# Patient Record
Sex: FEMALE | Race: BLACK OR AFRICAN AMERICAN | NOT HISPANIC OR LATINO | Employment: FULL TIME | URBAN - METROPOLITAN AREA
[De-identification: names, ages, dates, MRNs, and addresses within clinical notes are randomized per-mention and may not be internally consistent; named-entity substitution may affect disease eponyms.]

---

## 2017-02-17 ENCOUNTER — TELEPHONE (OUTPATIENT)
Dept: CARDIOLOGY UNIT | Facility: HOSPITAL | Age: 22
End: 2017-02-17

## 2017-02-17 ENCOUNTER — OFFICE VISIT (OUTPATIENT)
Dept: URGENT CARE | Age: 22
End: 2017-02-17
Payer: COMMERCIAL

## 2017-02-17 PROCEDURE — G0382 LEV 3 HOSP TYPE B ED VISIT: HCPCS

## 2017-02-28 ENCOUNTER — ALLSCRIPTS OFFICE VISIT (OUTPATIENT)
Dept: OTHER | Facility: OTHER | Age: 22
End: 2017-02-28

## 2017-05-05 ENCOUNTER — OFFICE VISIT (OUTPATIENT)
Dept: URGENT CARE | Age: 22
End: 2017-05-05
Payer: COMMERCIAL

## 2017-05-05 PROCEDURE — G0382 LEV 3 HOSP TYPE B ED VISIT: HCPCS | Performed by: FAMILY MEDICINE

## 2017-05-20 ENCOUNTER — OFFICE VISIT (OUTPATIENT)
Dept: URGENT CARE | Age: 22
End: 2017-05-20
Payer: COMMERCIAL

## 2017-05-20 PROCEDURE — G0382 LEV 3 HOSP TYPE B ED VISIT: HCPCS | Performed by: FAMILY MEDICINE

## 2017-08-18 ENCOUNTER — ALLSCRIPTS OFFICE VISIT (OUTPATIENT)
Dept: OTHER | Facility: OTHER | Age: 22
End: 2017-08-18

## 2017-09-01 ENCOUNTER — TRANSCRIBE ORDERS (OUTPATIENT)
Dept: ADMINISTRATIVE | Age: 22
End: 2017-09-01

## 2017-09-01 ENCOUNTER — APPOINTMENT (OUTPATIENT)
Dept: LAB | Age: 22
End: 2017-09-01
Attending: PREVENTIVE MEDICINE

## 2017-09-01 DIAGNOSIS — Z01.84 IMMUNITY STATUS TESTING: ICD-10-CM

## 2017-09-01 DIAGNOSIS — Z01.84 IMMUNITY STATUS TESTING: Primary | ICD-10-CM

## 2017-09-01 PROCEDURE — 36415 COLL VENOUS BLD VENIPUNCTURE: CPT

## 2017-09-01 PROCEDURE — 86706 HEP B SURFACE ANTIBODY: CPT

## 2017-09-03 LAB — HBV SURFACE AB SER-ACNC: 21.79 MIU/ML

## 2018-01-10 NOTE — MISCELLANEOUS
Message   Recorded as Task   Date: 05/20/2016 11:31 AM, Created By: Rasheeda Rice   Task Name: Medical Complaint Callback   Assigned To: [facility] triage,Team   Regarding Patient: Orin Alex, Status: In Progress   Comment:   Kat Dorman - 20 May 2016 11:31 AM    TASK CREATED  Caller: Rebecca Gomez, Mother; Medical Complaint; [phone] [facility] Phone)  Caden Reyna - 20 May 2016 11:36 AM    TASK IN PROGRESS   Bridget Guerrier - 20 May 2016 11:38 AM    TASK EDITED  LM for pt to call back  Kaitlin Moody - 20 May 2016 2:33 PM    TASK EDITED  LM call back        Active Problems   1  Allergic rhinitis (477 9) (J30 9)  2  Anisometropia (367 31) (H52 31)  3  Nasal polyp (471 9) (J33 9)  4  Need for HPV vaccination (V04 89) (Z23)    Current Meds  1  Fluticasone Propionate 50 MCG/ACT Nasal Suspension; USE 2 SPRAYS IN EACH   NOSTRIL ONCE DAILY; Therapy: 25XCJ5185 to (Evaluate:79Cwf9523)  Requested for: 36Xfz9176; Last   Rx:06Rzr2409 Ordered  2  Loratadine 10 MG Oral Tablet; take 1 tablet by mouth once daily; Therapy: 06VPQ8006 to (Olivia Gregory)  Requested for: 24KNT4030; Last   Rx:07Elr2347 Ordered  3  Tobramycin 0 3 % Ophthalmic Solution; INSTILL 1 DROP INTO AFFECTED EYE(S) 4   TIMES DAILY; Therapy: 28AYC9718 to (Last Rx:00Xmr0447)  Requested for: 55Jug3789 Ordered    Allergies   1  No Known Drug Allergies   2  Animal dander - Cats  3  Animal dander - Dogs  4  Grass  5   Trees    Signatures   Electronically signed by : Tanisha Pool, ; May 20 2016  3:59PM EST                       (Author)    Electronically signed by : OSMAR Ballard ; May 20 2016  4:24PM EST                       (Author)

## 2018-01-11 NOTE — PROGRESS NOTES
Assessment    1  Tinea versicolor (111 0) (B36 0)   2  Shortness of breath (786 05) (R06 02)   3  Allergic rhinitis (477 9) (J30 9)   4  Encounter to establish care (V65 8) (Z76 89)    Plan  Shortness of breath    · Ventolin  (90 Base) MCG/ACT Inhalation Aerosol Solution; INHALE 2  PUFFS EVERY 4-6 HOURS AS NEEDED   · Spirometry w/Bronchodilation; Status:Active; Requested for:79Znu7945;   Tinea versicolor    · Ketoconazole 200 MG Oral Tablet; Take 2 tablets once    Discussion/Summary  health maintenance visit Currently, she eats a healthy diet, has an adequate exercise regimen and plays soccer  cervical cancer screening is not indicated Breast cancer screening: breast cancer screening is not indicated  Colorectal cancer screening: colorectal cancer screening is not indicated  Osteoporosis screening: bone mineral density testing is not indicated  Advice and education were given regarding contraception, sunscreen use, self skin examination and seat belt use  Patient discussion: discussed with the patient  25 yr old female[de-identified]    1) Skin rash- likely tinea versicolor, ketoconazole sent to pharmacy 400 mg one time, may also buy Selsun blue shampoo to wash affected areas  Continue to monitor  2) Shortness of breath - responded to albuterol, Ventolin sent to pharmacy, given script for spirometry to eval for asthma  Will follow up results  No wheezing on exam today  3) Allergic rhinitis - stable, may be contributing to #2, continue with loratadine 10 mg at bedtime  Consider daily Flonase if symptoms worsen  RTC in 2 months for follow up  Discuss HPV vaccination at follow up visit, pt did not get series previously, mother declined  The patient was counseled regarding  Self Referrals: No      Chief Complaint  establish care, skin rash      History of Present Illness  , Adult Female: The patient is being seen for a health maintenance evaluation   The last health maintenance visit was 1 year(s) ago    General Health: The patient's health since the last visit is described as good  She has regular dental visits  She complains of vision problems  She denies hearing loss  Immunizations status: up to date   wears glasses, last appt was recently  Lifestyle:  She consumes a diverse and healthy diet  She does not have any weight concerns  She exercises regularly  She does not use tobacco  She consumes alcohol  She denies drug use  Reproductive health:  she reports normal menses  Screening: cancer screening reviewed and current  metabolic screening reviewed and current  risk screening reviewed and current  HPI: sickle cell trait, cramps easier with exercises, twin, She was twin A, family hx significant for sickle cell trait, mom has sickle cell trait  She is studying nursing in college goes to [employer] will be transferring to [employer]  Around thanksgiving having chest pain, developed difficulty breathing, with muscle soreness, states her aunt gave her neb treatment and symptoms resolved  Happens approx 1 per year  No heart palpitations  Saw ENT for nasal polyp, eval hearing is off retracted ear drum  Never diagnosed with asthma  She is also complaining of an itchy body rash on her trunk and upper extremities, states the red rash went away but is left with discolorations  Of note pt is sexually active, in a monogamous relationship with 1 partner, not on contraception, uses condoms  Has only had 1 sexual partner  No pregnancies, has regular menses  Review of Systems    Constitutional: no fever, not feeling poorly, no chills and not feeling tired  ENT: hearing loss, but no earache, no nosebleeds, no sore throat, no nasal discharge and no hoarseness  Cardiovascular: chest pain and palpitations  Respiratory: no shortness of breath, no cough and no wheezing  Gastrointestinal: no abdominal pain, no nausea, no vomiting, no constipation, no diarrhea and no blood in stools     Genitourinary: no dysuria, no pelvic pain, no vaginal discharge, no incontinence, no dysmenorrhea and no unexplained vaginal bleeding  Musculoskeletal: no arthralgias, no joint swelling, no limb pain, no myalgias, no joint stiffness and no limb swelling  Integumentary: a rash, itching and starting in november itchy rash all over her body, depigmintation of upper ext, but as noted in HPI, no breast pain, no skin lesions, no skin wound and no breast lump  Neurological: no headache, no numbness, no tingling, no confusion, no dizziness, no limb weakness, no convulsions, no fainting and no difficulty walking  Psychiatric: not suicidal, no anxiety, no personality change, no sleep disturbances, no depression and no emotional problems  Endocrine: no muscle weakness and no deepening of the voice  no feelings of weakness   Hematologic/Lymphatic: no tendency for easy bleeding and no tendency for easy bruising  Active Problems    1  Allergic rhinitis (477 9) (J30 9)   2  Anisometropia (367 31) (H52 31)   3  Nasal polyp (471 9) (J33 9)   4   Need for HPV vaccination (V04 89) (Z23)    Past Medical History    · History of Birth History   · History of Cellulitis of foot (682 7) (L03 119)   · Conjunctivitis (372 30) (H10 9)   · History of Foot infection (686 9) (L08 9)   · History of Insect bite, multiple (919 4,E906 4) (W57 XXXA)   · History of Paronychia of toe (681 11) (L03 039)    Surgical History    · History of Adenoidectomy   · History of Sinus Surgery   · History of Tonsillectomy    Family History  Mother    · Family history of Sickle Cell Trait  Father    · Family history of Hypertension (V17 49)   · Family history of Overweight  Sister    · Family history of Exercise-induced asthma   · Family history of Hypertension (V17 49)   · Family history of Sickle Cell Trait  Brother    · Family history of Sickle Cell Trait  Grandmother    · Family history of allergic rhinitis (V19 6) (Z84 89)  Grandfather    · Family history of Cancer  Family History    · Family history of Cancer   · Family history of Hypertension (V17 49)   · Family history of Overweight   · Family history of Sickle Cell Trait    Social History    · Lives with mother (single parent)   · Marital History - Single   · Never A Smoker   · Preferred Language English   · Sports    Current Meds   1  Loratadine 10 MG Oral Tablet; take 1 tablet by mouth once daily; Therapy: 90JVT0863 to (Kaylin Glasgow)  Requested for: 28PII6011; Last   Rx:38Rze1086 Ordered    Allergies    1  No Known Drug Allergies    2  Animal dander - Cats   3  Animal dander - Dogs   4  Grass   5  Trees    Vitals   Recorded: 24OOK1084 02:38PM   Temperature 98 8 F   Heart Rate 76   Respiration 16   Systolic 187   Diastolic 70   Height 5 ft 3 6 in   Weight 157 lb 2 oz   BMI Calculated 27 31   BSA Calculated 1 76   BMI Percentile 88 %   2-20 Stature Percentile 40 %   2-20 Weight Percentile 86 %   Pain Scale 0     Physical Exam    Constitutional   General appearance: No acute distress, well appearing and well nourished  Head and Face   Head and face: Normal     Palpation of the face and sinuses: No sinus tenderness  Eyes   Conjunctiva and lids: No swelling, erythema or discharge  Ears, Nose, Mouth, and Throat   External inspection of ears and nose: Normal     Otoscopic examination: Tympanic membranes translucent with normal light reflex  Canals patent without erythema  Hearing: Normal     Nasal mucosa, septum, and turbinates: Normal without edema or erythema  Oropharynx: Normal with no erythema, edema, exudate or lesions  Neck   Neck: Supple, symmetric, trachea midline, no masses  Thyroid: Normal, no thyromegaly  Pulmonary   Respiratory effort: No increased work of breathing or signs of respiratory distress  Auscultation of lungs: Clear to auscultation  Cardiovascular   Auscultation of heart: Normal rate and rhythm, normal S1 and S2, no murmurs  Abdomen   Abdomen: Non-tender, no masses  Lymphatic   Palpation of lymph nodes in neck: No lymphadenopathy  Musculoskeletal   Gait and station: Normal     Digits and nails: Normal without clubbing or cyanosis  Range of motion: Normal     Stability: Normal     Muscle strength/tone: Normal     Skin discoloration on trunk and arms, consistent with tinea versicolor  Neurologic   Cranial nerves: Cranial nerves II-XII intact  Reflexes: 2+ and symmetric  Sensation: No sensory loss  Psychiatric   Orientation to person, place, and time: Normal     Mood and affect: Normal        Attending Note  Attending Note: Attending Note: the staff discussed the patient on the day of the visit, I discussed the case with the Resident and reviewed the Resident's note, I supervised the Resident and I agree with the Resident management plan as it was presented to me  Level of Participation: I was present in clinic, but did not examine the patient  I agree with the Resident's note        Future Appointments    Date/Time Provider Specialty Site   03/09/2017 02:00 PM Kristen Biswas MD Family Medicine [address]   Electronically signed by : Matt Mendez MD; Dec  7 2016  4:52PM EST                       (Author)    Electronically signed by : OSMAR Matamoros ; Dec  8 2016  1:15PM EST                       (Author)

## 2018-01-12 NOTE — PROGRESS NOTES
Assessment    1  Encounter for preventive health examination (V70 0) (Z00 00)   2  Moderate persistent asthma without complication (530 40) (P72 35)   3  Insomnia (780 52) (G47 00)    Plan  Asthma    · Qvar 40 MCG/ACT Inhalation Aerosol Solution; INHALE 2 PUFFS TWICE DAILY  PPD screening test    · PPD    Discussion/Summary  health maintenance visit     24 yo F with the followin  Asthma - Has night time awakenings about 2x per week; moderate persistent asthma  Will start ICS (Qvar)  2  Insomnia - Advised on good sleep hygiene  Advised that not eat much during the day, and then eating a 2AM when she returns home may increase energy and wakefulness  Advised to try eating meals during the day while at work  Tx for #1 should help decrease nocturnal coughing and decrease Albuterol use which has a stimulatory effect  3  HM - Immunizations are up to date  Plans to find gynecologist in order to do cervical CA screening  PPD given today  Return on Monday for read  RTC in 1 year or sooner as needed  4  RTC in 1 year or sooner as needed  The patient was counseled regarding instructions for management, impressions  Possible side effects of new medications were reviewed with the patient/guardian today  The treatment plan was reviewed with the patient/guardian  The patient/guardian understands and agrees with the treatment plan      Chief Complaint  school PEx  Insomnia      History of Present Illness  HM, Adult Female: The patient is being seen for a health maintenance evaluation  General Health:   Screening:   HPI: 24 yo F who presents for wellness visit and school PEx  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  C/o insomnia  Works from [employer] to [employer] and has difficulty sleeping  Works as a waterer and caterer  Snacks during the day, but never eats an actual meal  Comes home and eats around 2-2:30am before going to bed  Night time Sxs about 2x per week requiring albuterol   Parents have 3 cats and 2 dogs of which she's allergic  Review of Systems    Constitutional: no fever and no chills  Gastrointestinal: no abdominal pain, no nausea, no vomiting, no constipation and no diarrhea  Genitourinary: no vaginal discharge  Musculoskeletal: LE swelling, but no arthralgias  Neurological: no headache  Active Problems    1  Acute sinus infection (461 9) (J01 90)   2  Allergic rhinitis (477 9) (J30 9)   3  Anisometropia (367 31) (H52 31)   4  Contact dermatitis (692 9) (L25 9)   5  Encounter to establish care (V65 8) (Z76 89)   6  Moderate persistent asthma without complication (826 65) (A51 19)   7  Nasal polyp (471 9) (J33 9)   8  Need for HPV vaccination (V04 89) (Z23)   9  Shortness of breath (786 05) (R06 02)   10   Tinea versicolor (111 0) (B36 0)    Past Medical History    · History of Birth History   · History of Cellulitis of foot (682 7) (L03 119)   · Conjunctivitis (372 30) (H10 9)   · History of Foot infection (686 9) (L08 9)   · History of Insect bite, multiple (919 4,E906 4) (W57 XXXA)   · History of Paronychia of toe (681 11) (L03 039)    Surgical History    · History of Adenoidectomy   · History of Sinus Surgery   · History of Tonsillectomy    Family History  Mother    · Family history of Sickle Cell Trait  Father    · Family history of Hypertension (V17 49)   · Family history of Overweight  Sister    · Family history of Exercise-induced asthma   · Family history of Hypertension (V17 49)   · Family history of Sickle Cell Trait  Brother    · Family history of Sickle Cell Trait  Grandmother    · Family history of allergic rhinitis (V19 6) (Z84 89)  Grandfather    · Family history of Cancer  Family History    · Family history of Cancer   · Family history of Hypertension (V17 49)   · Family history of Overweight   · Family history of Sickle Cell Trait    Social History    · Lives with mother (single parent)   · Marital History - Single   · Never A Smoker   · Preferred Language English   · Sports    Current Meds   1  Loratadine 10 MG Oral Tablet; take 1 tablet by mouth once daily; Therapy: 16EHF1830 to (Evaluate:55Hqf9387)  Requested for: 44BUL1046; Last   Rx:28Feb2017 Ordered   2  Mometasone Furoate 0 1 % External Cream; APPLY SPARINGLY TO AFFECTED AREAS   TWICE DAILY  (AM AND PM); Therapy: 72RTC7169 to (Last VE:72DRW5768)  Requested for: 44GYP1253 Ordered   3  Ventolin  (90 Base) MCG/ACT Inhalation Aerosol Solution; INHALE 2 PUFFS   EVERY 4-6 HOURS AS NEEDED; Therapy: 60Wqd7711 to (Evaluate:30Mar2017)  Requested for: 28Feb2017; Last   Rx:28Feb2017 Ordered    Allergies    1  No Known Drug Allergies    2  Animal dander - Cats   3  Animal dander - Dogs   4  Grass   5  Trees    Vitals   Recorded: 72XQW9612 08:35AM   Temperature 97 2 F   Heart Rate 88   Respiration 16   Systolic 164   Diastolic 72   Height 5 ft 2 in   Weight 163 lb 4 oz   BMI Calculated 29 86   BSA Calculated 1 75   Pain Scale 0     Physical Exam    Constitutional   General appearance: No acute distress, well appearing and well nourished  Head and Face   Head and face: Normal     Palpation of the face and sinuses: No sinus tenderness  Eyes   Conjunctiva and lids: No swelling, erythema or discharge  Pupils and irises: Equal, round, reactive to light  Ears, Nose, Mouth, and Throat   External inspection of ears and nose: Normal     Otoscopic examination: Tympanic membranes translucent with normal light reflex  Canals patent without erythema  Hearing: Normal     Nasal mucosa, septum, and turbinates: Normal without edema or erythema  Lips, teeth, and gums: Normal, good dentition  Oropharynx: Normal with no erythema, edema, exudate or lesions  Neck   Neck: Supple, symmetric, trachea midline, no masses  Thyroid: Normal, no thyromegaly  Pulmonary   Respiratory effort: No increased work of breathing or signs of respiratory distress  Auscultation of lungs: Clear to auscultation  Cardiovascular   Auscultation of heart: Normal rate and rhythm, normal S1 and S2, no murmurs  Abdomen   Abdomen: Non-tender, no masses  Liver and spleen: No hepatomegaly or splenomegaly  Lymphatic   Palpation of lymph nodes in neck: No lymphadenopathy  Musculoskeletal   Gait and station: Normal     Digits and nails: Normal without clubbing or cyanosis  Range of motion: Normal     Stability: Normal     Skin   Skin and subcutaneous tissue: Normal without rashes or lesions  Psychiatric   Judgment and insight: Normal     Orientation to person, place, and time: Normal     Recent and remote memory: Intact  Mood and affect: Normal        Attending Note  Attending Note: Attending Note: I discussed the case with the Resident and reviewed the Resident's note, I supervised the Resident and I agree with the Resident management plan as it was presented to me  Level of Participation: I was present in clinic, but did not examine the patient  Comments/Additional Findings: moderate persistent asthma, not with good control; agree with addition of inhaled steroid  I agree with the Resident's note        Future Appointments    Date/Time Provider Specialty Site   08/21/2017 05:30 PM Nurse Rafael [address]     Signatures   Electronically signed by : OSMAR Montelongo ; Aug 18 2017  9:53AM EST                       (Author)    Electronically signed by : OSMAR Demarco ; Aug 18 2017 10:02AM EST                       (Author)

## 2018-01-14 VITALS
DIASTOLIC BLOOD PRESSURE: 72 MMHG | HEIGHT: 62 IN | WEIGHT: 156.25 LBS | SYSTOLIC BLOOD PRESSURE: 124 MMHG | RESPIRATION RATE: 14 BRPM | BODY MASS INDEX: 28.75 KG/M2 | TEMPERATURE: 97.2 F | HEART RATE: 74 BPM

## 2018-01-14 VITALS
BODY MASS INDEX: 30.04 KG/M2 | HEIGHT: 62 IN | DIASTOLIC BLOOD PRESSURE: 72 MMHG | WEIGHT: 163.25 LBS | HEART RATE: 88 BPM | RESPIRATION RATE: 16 BRPM | SYSTOLIC BLOOD PRESSURE: 114 MMHG | TEMPERATURE: 97.2 F

## 2018-03-08 ENCOUNTER — OFFICE VISIT (OUTPATIENT)
Dept: URGENT CARE | Age: 23
End: 2018-03-08
Payer: COMMERCIAL

## 2018-03-08 VITALS
SYSTOLIC BLOOD PRESSURE: 110 MMHG | WEIGHT: 172.6 LBS | RESPIRATION RATE: 18 BRPM | HEART RATE: 74 BPM | OXYGEN SATURATION: 98 % | DIASTOLIC BLOOD PRESSURE: 70 MMHG | HEIGHT: 62 IN | BODY MASS INDEX: 31.76 KG/M2 | TEMPERATURE: 98.2 F

## 2018-03-08 DIAGNOSIS — J06.9 VIRAL UPPER RESPIRATORY TRACT INFECTION: Primary | ICD-10-CM

## 2018-03-08 PROCEDURE — 99213 OFFICE O/P EST LOW 20 MIN: CPT | Performed by: FAMILY MEDICINE

## 2018-03-08 RX ORDER — LORATADINE 10 MG/1
1 TABLET ORAL DAILY
COMMUNITY
Start: 2014-05-09 | End: 2018-12-18 | Stop reason: SDUPTHER

## 2018-03-08 RX ORDER — ALBUTEROL SULFATE 90 UG/1
2 AEROSOL, METERED RESPIRATORY (INHALATION) EVERY 4 HOURS PRN
COMMUNITY
Start: 2017-02-22 | End: 2018-12-18 | Stop reason: SDUPTHER

## 2018-03-08 NOTE — PROGRESS NOTES
[facility]        NAME: Marie Case is a 23 y o  female  : 1995    MRN: [id number]  DATE: 2018  TIME: 3:15 PM    Assessment and Plan   Viral upper respiratory tract infection [J06 9, B97 89]  1  Viral upper respiratory tract infection           Patient Instructions     Follow up with PCP in 3-5 days  Proceed to  ER if symptoms worsen  Chief Complaint     Chief Complaint   Patient presents with    Cold Like Symptoms     Since  - congested cough with SHANKAR and occ  wheeze when supine (using Ventolin TID); nasal congestion with PND and facial pain; b/l ear pressure and fever - yesterday 101 3    Cough    Fever         History of Present Illness       Patient presents with 5 days of cold symptoms  She is complaining of nasal congestion, ear pressure, postnasal drainage, cough  Patient states for the 1st 3 days she had a fever with a since resolved  Patient has a history of asthma she takes Pulmicort daily and has been using her albuterol every 4 hours  Review of Systems   Review of Systems   Constitutional: Positive for fatigue  Negative for activity change, appetite change, chills, diaphoresis and fever  HENT: Positive for congestion, ear pain, postnasal drip, rhinorrhea and sore throat  Negative for ear discharge, facial swelling, hearing loss, mouth sores, sinus pain, sinus pressure and sneezing  Eyes: Negative for discharge and redness  Respiratory: Positive for cough and wheezing  Negative for shortness of breath  Gastrointestinal: Negative for diarrhea, nausea and vomiting  Neurological: Negative for headaches           Current Medications       Current Outpatient Prescriptions:     albuterol (VENTOLIN HFA) 90 mcg/act inhaler, Inhale 2 puffs every 4 (four) hours as needed, Disp: , Rfl:     budesonide (PULMICORT FLEXHALER) 90 MCG/ACT inhaler, Inhale 1 puff daily, Disp: , Rfl:     loratadine (CLARITIN) 10 mg tablet, Take 1 tablet by mouth daily, Disp: , Rfl:     Current Allergies     Allergies as of 03/08/2018 - Reviewed 03/08/2018   Allergen Reaction Noted    Cat hair extract Hives and Itching 05/09/2014    Other Itching and Nasal Congestion 05/09/2014    Dog epithelium Hives and Itching 05/09/2014    Tree extract Itching and Nasal Congestion 05/09/2014            The following portions of the patient's history were reviewed and updated as appropriate: allergies, current medications, past family history, past medical history, past social history, past surgical history and problem list     Objective   /70 (BP Location: Right arm, Patient Position: Sitting, Cuff Size: Standard)   Pulse 74   Temp 98 2 °F (36 8 °C) (Oral)   Resp 18   Ht 5' 2" (1 575 m)   Wt 78 3 kg (172 lb 9 6 oz)   LMP 03/04/2018 (Exact Date)   SpO2 98%   BMI 31 57 kg/m²        Physical Exam     Physical Exam   Constitutional: She appears well-developed and well-nourished  No distress  HENT:   Head: Normocephalic and atraumatic  Right Ear: Tympanic membrane, external ear and ear canal normal    Left Ear: Tympanic membrane, external ear and ear canal normal    Nose: Mucosal edema and rhinorrhea present  Right sinus exhibits no maxillary sinus tenderness and no frontal sinus tenderness  Left sinus exhibits no maxillary sinus tenderness and no frontal sinus tenderness  Mouth/Throat: Oropharynx is clear and moist and mucous membranes are normal  No oropharyngeal exudate, posterior oropharyngeal edema or posterior oropharyngeal erythema  Eyes: Conjunctivae and lids are normal    Cardiovascular: Normal rate, regular rhythm and normal heart sounds  Pulmonary/Chest: Effort normal and breath sounds normal    Lymphadenopathy:        Head (right side): No tonsillar adenopathy present  Head (left side): No tonsillar adenopathy present  Skin: No rash noted  Nursing note and vitals reviewed

## 2018-03-08 NOTE — PATIENT INSTRUCTIONS
Cold symptoms:  Advised patient to try over-the-counter Flonase for nasal congestion and ear pressure  Ibuprofen as needed for fever body aches  Continue with Pulmicort daily and albuterol as needed  Cold Symptoms   AMBULATORY CARE:   Cold symptoms  include sneezing, dry throat, a stuffy nose, headache, watery eyes, and a cough  Your cough may be dry, or you may cough up mucus  You may also have muscle aches, joint pain, and tiredness  Rarely, you may have a fever  Cold symptoms occur from inflammation in your upper respiratory system caused by a virus  Most colds go away without treatment  Seek care immediately if:   · You have increased tiredness and weakness  · You are unable to eat  · Your heart is beating much faster than usual for you  · You see white spots in the back of your throat and your neck is swollen and sore to the touch  · You see pinpoint or larger reddish-purple dots on your skin  Contact your healthcare provider if:   · You have a fever higher than 102°F (38 9°C)  · You have new or worsening shortness of breath  · You have thick nasal drainage for more than 2 days  · Your symptoms do not improve or get worse within 5 days  · You have questions or concerns about your condition or care  Treatment for cold symptoms  may include NSAIDS to decrease muscle aches and fever  Cold medicines may also be given to decrease coughing, nasal stuffiness, sneezing, and a runny nose  Manage your cold symptoms: The following may help relieve cold symptoms, such as a dry throat and congestion:  · Gargle with mouthwash or warm salt water as directed  · Suck on throat lozenges or hard candy  · Use a cold or warm vaporizer or humidifier to ease your breathing  · Rest for at least 2 days and then as needed to decrease tiredness and weakness  · Use petroleum based jelly around your nostrils to decrease irritation from blowing your nose       · Drink plenty of liquids  Liquids will help thin and loosen thick mucus so you can cough it up  Liquids will also keep you hydrated  Ask your healthcare provider which liquids are best for you and how much to drink each day  Prevent the spread of germs  by washing your hands often  You can spread your cold germs to others for at least 3 days after your symptoms start  Do not share items, such as eating utensils  Cover your nose and mouth when you cough or sneeze using the crook of your elbow instead of your hands  Throw used tissues in the garbage  Do not smoke:  Smoking may worsen your symptoms and increase the length of time you feel sick  Talk with your healthcare provider if you need help to stop smoking  Follow up with your healthcare provider as directed:  Write down your questions so you remember to ask them during your visits  © 2017 [employer] Information is for End User's use only and may not be sold, redistributed or otherwise used for commercial purposes  All illustrations and images included in CareNotes® are the copyrighted property of [employer]  or [employer] Walker  The above information is an  only  It is not intended as medical advice for individual conditions or treatments  Talk to your doctor, nurse or pharmacist before following any medical regimen to see if it is safe and effective for you

## 2018-08-08 ENCOUNTER — OFFICE VISIT (OUTPATIENT)
Dept: URGENT CARE | Age: 23
End: 2018-08-08
Payer: COMMERCIAL

## 2018-08-08 VITALS
TEMPERATURE: 97.8 F | HEART RATE: 77 BPM | SYSTOLIC BLOOD PRESSURE: 157 MMHG | OXYGEN SATURATION: 100 % | RESPIRATION RATE: 20 BRPM | DIASTOLIC BLOOD PRESSURE: 81 MMHG

## 2018-08-08 DIAGNOSIS — L23.3 ALLERGIC CONTACT DERMATITIS DUE TO DRUGS IN CONTACT WITH SKIN: ICD-10-CM

## 2018-08-08 DIAGNOSIS — R21 RASH: Primary | ICD-10-CM

## 2018-08-08 DIAGNOSIS — B86 SCABIES INFESTATION: ICD-10-CM

## 2018-08-08 PROCEDURE — 99213 OFFICE O/P EST LOW 20 MIN: CPT | Performed by: FAMILY MEDICINE

## 2018-08-08 RX ORDER — METHYLPREDNISOLONE 4 MG/1
TABLET ORAL
Qty: 21 TABLET | Refills: 0 | Status: SHIPPED | OUTPATIENT
Start: 2018-08-08 | End: 2018-12-04

## 2018-08-08 NOTE — PATIENT INSTRUCTIONS
Prescription sent to pharmacy for Crotan cream and methylprednisolone pack-use as directed  Benadryl as needed for inflammation and itching  Wash all bed sheets and any items that may have come in contact  Follow up with PCP in 3-5 days  Proceed to  ER if symptoms worsen  Scabies   AMBULATORY CARE:   Scabies  is a skin condition that is caused by scabies mites  Scabies mites are tiny bugs that burrow, lay eggs, and live underneath the skin  Scabies is spread through close contact with a person who has scabies  This includes having sex, sleeping in the same bed, or sharing towels or clothing  Scabies can spread quickly and must be treated as soon as it is found  Common signs and symptoms: You may not know you have scabies until a few weeks after mites are under your skin  Scabies mites are too small to be seen on your body  You may have any of the following:  · Red, raised bumps on your skin    · Bad itching that is usually worse at night    · Burrow marks (short wavy lines) between your fingers, or on your ankles, elbows, groin, armpits, or breasts  Seek care immediately if:   · You develop a fever and red, swollen, painful areas on your skin  Contact your healthcare provider if:   · The bites become crusty or filled with pus  · You have worsening itching after scabies treatment  · You have new bite or burrow marks after treatment  · You have questions or concerns about your condition or care  Treatment:  Your healthcare provider may want to treat scabies even if signs of mites are not found  Several kinds of medicine may be used to treat scabies  The medicine may be a cream or pill  Always follow the directions for the scabies medicine you are told to use  · Your healthcare provider may tell you to rub a thin layer of the medicine onto your entire body from the neck down  · Leave the cream on for the amount of time that is required for the medicine you are using   This may be between 8 to 14 hours  · Take a bath or shower to wash all medicine from your skin after the scabies treatment is done  · Put on clean clothes after you have rinsed the medicine off  You may need another scabies treatment in about 7 to 10 days if you continue to have symptoms  Help relieve itching: Your skin may continue to itch for 2 or 3 weeks, even after the scabies mites are gone  Over-the-counter antihistamines or cortisone cream may help relieve itching  Trim your fingernails so you do not spread any mites that are still alive after treatment  Do not scratch your skin  Scratches may cause a skin infection  A cool bath may also help relieve the itching  Prevent the spread of scabies:   · Have all family members use scabies medicine  Tell all sex partners and anyone who has shared your clothing or bed for the past month about the scabies  Tell them to ask their healthcare provider for scabies medicine even if they have no itching, rash, or burrow marks  · Wash all items that you have used since 3 days before you learned about your scabies  Use hot water to wash all clothing, bedding, and towels  Dry them for at least 20 minutes on the hot cycle of a dryer  Take items to be dry cleaned that cannot be washed in a washing machine  Place any clothing or bedding that cannot be washed or dry cleaned in a closed plastic bag for 1 week  · Do not have close body contact with anyone until the scabies mites are gone  Talk to your healthcare provider about how long you need to wait  Also ask about public places to avoid, such as the gym  Return to school or work: You may return to school or work 24 hours after using scabies medicine  Follow up with your healthcare provider as directed:  Write down your questions so you remember to ask them during your visits  © 2017 [employer] Information is for End User's use only and may not be sold, redistributed or otherwise used for commercial purposes   All illustrations and images included in CareNotes® are the copyrighted property of [employer]  or [employer]  The above information is an  only  It is not intended as medical advice for individual conditions or treatments  Talk to your doctor, nurse or pharmacist before following any medical regimen to see if it is safe and effective for you  Contact Dermatitis   AMBULATORY CARE:   Contact dermatitis  is a rash  It develops when you touch something that irritates your skin or causes an allergic reaction  Common signs and symptoms include the following:   · Red, swollen, painful rash           · Skin that itches, stings, or burns    · Dry, scaly, or crusty skin patches    · Bumps or blisters    · Fluid draining from blisters  Call 911 for any of the following:   · You have sudden trouble breathing  · Your throat swells and you have trouble eating  · Your face is swollen  Contact your healthcare provider if:   · You have a fever  · Your blisters are draining pus  · Your rash spreads or does not get better, even after treatment  · You have questions or concerns about your condition or care  Treatment for contact dermatitis  involves removing any irritants or allergens that cause your rash  You may also need medicines to decrease itching and swelling  They will be given as a topical medicine to apply to your rash or as a pill  Manage contact dermatitis:   · Take short baths or showers in cool water  Use mild soap or soap-free cleansers  Add oatmeal, baking soda, or cornstarch to the bath water to help decrease skin irritation  · Avoid skin irritants , such as makeup, hair products, soaps, and cleansers  Use products that do not contain perfume or dye  · Apply a cool compress to your rash  This will help soothe your skin  · Keep your skin moist   Rub unscented cream or lotion on your skin to prevent dryness and itching   Do this right after a bath or shower when your skin is still damp  Follow up with your healthcare provider as directed:  Write down your questions so you remember to ask them during your visits  © 2017 [employer] Information is for End User's use only and may not be sold, redistributed or otherwise used for commercial purposes  All illustrations and images included in CareNotes® are the copyrighted property of [employer]  or [employer]  The above information is an  only  It is not intended as medical advice for individual conditions or treatments  Talk to your doctor, nurse or pharmacist before following any medical regimen to see if it is safe and effective for you

## 2018-08-08 NOTE — PROGRESS NOTES
[facility]        NAME: Loman Lombard is a 26 y o  female  : 1995    MRN: [id number]  DATE: 2018  TIME: 3:55 PM    Assessment and Plan   Rash [R21]  1  Rash  Methylprednisolone 4 MG TBPK   2  Scabies infestation  crotamiton (CROTAN) 10 % cream   3  Allergic contact dermatitis due to drugs in contact with skin           Patient Instructions   The rash primarily on the patient's arms appears to be scabies especially with recent exposure  Her all over body rash appears to be contact dermatitis that may be secondary to the permethrin cream   Prescription sent to pharmacy for Crotan cream and methylprednisolone pack-use as directed  Benadryl as needed for inflammation and itching  Wash all bed sheets and any items that may have come in contact  Follow up with PCP in 3-5 days  Proceed to  ER if symptoms worsen  Chief Complaint     Chief Complaint   Patient presents with    Rash      both arms and entire body got worse Monday  History of Present Illness   The patient is a 46-year-old female who presents with a rash over entire body  She states that she was recently exposed to scabies and did do the permethrin treatment  The following day, however, she developed an itchy rash covering her entire body  She denies difficulty breathing or wheezing  Negative throat swelling or difficulty swallowing  Negative fever or chills  Negative upper respiratory symptoms  Negative nausea, vomiting or diarrhea  HPI    Review of Systems   Review of Systems   Constitutional: Negative for chills and fever  HENT: Negative  Respiratory: Negative  Negative for shortness of breath, wheezing and stridor  Cardiovascular: Negative  Negative for chest pain  Gastrointestinal: Negative  Negative for diarrhea, nausea and vomiting  Skin: Positive for rash  Neurological: Negative for dizziness and light-headedness  All other systems reviewed and are negative          Current Medications       Current Outpatient Prescriptions:     albuterol (VENTOLIN HFA) 90 mcg/act inhaler, Inhale 2 puffs every 4 (four) hours as needed, Disp: , Rfl:     budesonide (PULMICORT FLEXHALER) 90 MCG/ACT inhaler, Inhale 1 puff daily, Disp: , Rfl:     crotamiton (CROTAN) 10 % cream, Apply topically daily for 2 doses Apply to affected area and then repeat in 24 hours  , Disp: 60 g, Rfl: 0    loratadine (CLARITIN) 10 mg tablet, Take 1 tablet by mouth daily, Disp: , Rfl:     Methylprednisolone 4 MG TBPK, Use as directed on package, Disp: 21 tablet, Rfl: 0    Current Allergies     Allergies as of 08/08/2018 - Reviewed 08/08/2018   Allergen Reaction Noted    Cat hair extract Hives and Itching 05/09/2014    Other Itching and Nasal Congestion 05/09/2014    Dog epithelium Hives and Itching 05/09/2014    Tree extract Itching and Nasal Congestion 05/09/2014            The following portions of the patient's history were reviewed and updated as appropriate: allergies, current medications, past family history, past medical history, past social history, past surgical history and problem list      Past Medical History:   Diagnosis Date    Allergic rhinitis     Asthma        Past Surgical History:   Procedure Laterality Date    ADENOIDECTOMY      EAR SURGERY      external - cyst    MYRINGOTOMY W/ TUBES      TONSILLECTOMY      WISDOM TOOTH EXTRACTION         History reviewed  No pertinent family history  Medications have been verified  Objective   /81   Pulse 77   Temp 97 8 °F (36 6 °C) (Temporal)   Resp 20   LMP 07/18/2018 (Approximate)   SpO2 100%        Physical Exam     Physical Exam   Constitutional: She is oriented to person, place, and time  She appears well-developed and well-nourished  No distress  HENT:   Head: Normocephalic and atraumatic  Pulmonary/Chest: Effort normal  No respiratory distress  Musculoskeletal: Normal range of motion  She exhibits no edema     Neurological: She is alert and oriented to person, place, and time  Skin: Skin is warm and dry  Rash noted  She is not diaphoretic  The patient has 2 separate rashes  On her bilateral arms, legs and torso she has what appears to be small bite and linear marks  Over her entire body she has an erythematous, raised rash  Positive excoriation  Negative drainage  Psychiatric: She has a normal mood and affect  Her behavior is normal    Nursing note and vitals reviewed

## 2018-09-06 ENCOUNTER — OFFICE VISIT (OUTPATIENT)
Dept: FAMILY MEDICINE CLINIC | Facility: CLINIC | Age: 23
End: 2018-09-06
Payer: COMMERCIAL

## 2018-09-06 VITALS
HEART RATE: 80 BPM | TEMPERATURE: 98.8 F | BODY MASS INDEX: 30.72 KG/M2 | WEIGHT: 173.4 LBS | RESPIRATION RATE: 16 BRPM | SYSTOLIC BLOOD PRESSURE: 124 MMHG | DIASTOLIC BLOOD PRESSURE: 72 MMHG | HEIGHT: 63 IN

## 2018-09-06 DIAGNOSIS — K58.2 IRRITABLE BOWEL SYNDROME WITH BOTH CONSTIPATION AND DIARRHEA: Primary | ICD-10-CM

## 2018-09-06 PROBLEM — J45.40 MODERATE PERSISTENT ASTHMA WITHOUT COMPLICATION: Status: ACTIVE | Noted: 2017-02-28

## 2018-09-06 PROBLEM — G47.00 INSOMNIA: Status: ACTIVE | Noted: 2017-08-18

## 2018-09-06 PROCEDURE — 3008F BODY MASS INDEX DOCD: CPT | Performed by: FAMILY MEDICINE

## 2018-09-06 PROCEDURE — 1036F TOBACCO NON-USER: CPT | Performed by: FAMILY MEDICINE

## 2018-09-06 PROCEDURE — 99213 OFFICE O/P EST LOW 20 MIN: CPT | Performed by: FAMILY MEDICINE

## 2018-09-06 RX ORDER — DOCUSATE SODIUM 100 MG/1
100 CAPSULE, LIQUID FILLED ORAL 2 TIMES DAILY
Qty: 10 CAPSULE | Refills: 0 | Status: CANCELLED | OUTPATIENT
Start: 2018-09-06

## 2018-09-06 RX ORDER — DICYCLOMINE HCL 20 MG
20 TABLET ORAL EVERY 6 HOURS
Qty: 30 TABLET | Refills: 0 | Status: SHIPPED | OUTPATIENT
Start: 2018-09-06 | End: 2018-12-04

## 2018-09-06 NOTE — PATIENT INSTRUCTIONS
Irritable Bowel Syndrome   AMBULATORY CARE:   Irritable bowel syndrome (IBS)  is a condition that prevents food from moving through your intestines normally  The food may move through too slowly or too quickly  This causes bloating, increased gas, constipation, or diarrhea  Signs and symptoms of IBS may come and go  Symptoms can occur a few times a week to once a month  IBS can go away for years and suddenly return  Your symptoms may worsen after you eat a big meal or if you do not eat enough healthy foods  Common symptoms include the following:   · Abdominal pain that disappears after you have a bowel movement    · Abdominal cramps that are worse after you eat    · Gas    · Bloated abdomen    · Diarrhea, constipation, or both     · Feeling like you need to have a bowel movement after you just had one    · Mucus in your bowel movement    · Feeling that you have not completely emptied your bowels after a bowel movement  Seek care immediately if:   · You have severe abdominal pain  · Your bowel movements are dark or have blood in them  Contact your healthcare provider if:   · You have a fever  · You have pain in your rectum  · Your abdominal pain does not go away, even after treatment  · You have questions or concerns about your condition or care  Treatment for IBS  may include medicine to decrease diarrhea or soften your bowel movements  You may also need medicine to treat constipation or decrease abdominal pain and muscle spasms  Manage your symptoms:   · Eat a variety of healthy foods  Healthy foods include fruits, vegetables, whole-grain breads, low-fat dairy products, beans, lean meats, and fish  You may need to avoid certain foods to decrease your symptoms  · Drink liquids as directed  Ask how much liquid to drink each day and which liquids are best for you  For most people, good liquids to drink are water, juice, and milk  · Exercise regularly  Ask about the best exercise plan for you  Exercise can decrease your blood pressure and improve your health  · Manage stress  Stress may slow healing and cause illness  Learn new ways to relax, such as deep breathing  · Keep a record  of everything you eat and drink, and your symptoms, for 3 weeks  Bring this record with you to your follow-up visits  Follow up with your healthcare provider as directed:  Write down your questions so you remember to ask them during your visits  © 2017 [employer] Information is for End User's use only and may not be sold, redistributed or otherwise used for commercial purposes  All illustrations and images included in CareNotes® are the copyrighted property of [employer]  or [employer]  The above information is an  only  It is not intended as medical advice for individual conditions or treatments  Talk to your doctor, nurse or pharmacist before following any medical regimen to see if it is safe and effective for you

## 2018-09-06 NOTE — PROGRESS NOTES
Sergio Multani 1995 female MRN: [id number]    Acute Visit    SUBJECTIVE    CC: GI Problem; Diarrhea; and Constipation    HPI:  Sergio Multani is a 26 y o  female who presented for an acute visit complaining of HPI     She presents with abdominal pain associated with diarrhea and constipation  Starting last year, she noticed she was bloated when consuming gluten  She cut this out and it greatly improved  2 weeks ago started with cramping in bilateral LQ and bloating  Discomfort throughout the day, ranges from 2-10/10  Associated nausea, Denies vomiting  Stool color varies greatly from juani to very dark brown  Denies blood or mucus in the stool  Has tried Pepto-Bismol, Gilda-Durbin, peppermint tea, none of which helped  Denies fevers, chills, headaches, urinary symptoms  Is currently sexually active but not in the last 3 months  LMP 1 month ago  Hx lactose intolerance  2 weeks ago with URI Sx  Review of Systems   Constitutional: Negative for activity change, chills and fever  HENT: Negative for congestion, rhinorrhea and sore throat  Eyes: Negative for visual disturbance  Respiratory: Negative for cough, shortness of breath and wheezing  Cardiovascular: Negative for chest pain and palpitations  Gastrointestinal: Positive for abdominal pain, constipation, diarrhea and nausea  Negative for blood in stool and vomiting  Genitourinary: Negative for dysuria  Musculoskeletal: Negative for arthralgias and myalgias  Skin: Negative for rash  Neurological: Negative for dizziness, weakness and headaches  All other systems reviewed and are negative        Historical Information   The patient history was reviewed as follows:  Past Medical History:   Diagnosis Date    Allergic rhinitis     Anisometropia 9/25/2015    Asthma      Past Surgical History:   Procedure Laterality Date    ADENOIDECTOMY      EAR SURGERY      external - cyst    MYRINGOTOMY W/ TUBES      SINUS SURGERY      TONSILLECTOMY      WISDOM TOOTH EXTRACTION       Family History   Problem Relation Age of Onset    Sickle cell trait Mother     Hypertension Father     Other Father         Overweight    Asthma Sister     Hypertension Sister     Sickle cell trait Sister     Sickle cell trait Brother     Cancer Family     Allergic rhinitis Family       Social History   History   Alcohol Use    2 4 oz/week    4 Glasses of wine per week     Comment: yearly     History   Drug Use No     History   Smoking Status    Never Smoker   Smokeless Tobacco    Never Used       Medications:   Meds/Allergies   Current Outpatient Prescriptions   Medication Sig Dispense Refill    albuterol (VENTOLIN HFA) 90 mcg/act inhaler Inhale 2 puffs every 4 (four) hours as needed      budesonide (PULMICORT FLEXHALER) 90 MCG/ACT inhaler Inhale 1 puff daily      loratadine (CLARITIN) 10 mg tablet Take 1 tablet by mouth daily      dicyclomine (BENTYL) 20 mg tablet Take 1 tablet (20 mg total) by mouth every 6 (six) hours 30 tablet 0    Methylprednisolone 4 MG TBPK Use as directed on package (Patient not taking: Reported on 9/6/2018 ) 21 tablet 0     No current facility-administered medications for this visit  Allergies   Allergen Reactions    Cat Hair Extract Hives and Itching     Hives - with contact    Other Itching and Nasal Congestion     Grass    Dog Epithelium Hives and Itching     Hives on contact    Tree Extract Itching and Nasal Congestion       OBJECTIVE    Vitals:   Vitals:    09/06/18 1501   BP: 124/72   Pulse: 80   Resp: 16   Temp: 98 8 °F (37 1 °C)   Weight: 78 7 kg (173 lb 6 4 oz)   Height: 5' 3 2" (1 605 m)       Physical Exam   Constitutional: She appears well-developed and well-nourished  HENT:   Head: Normocephalic and atraumatic  Eyes: Conjunctivae and EOM are normal    Cardiovascular: Normal rate, regular rhythm, normal heart sounds and intact distal pulses  No murmur heard    Pulmonary/Chest: Effort normal and breath sounds normal  No respiratory distress  She has no wheezes  Abdominal: Soft  Bowel sounds are normal  She exhibits no distension  There is tenderness in the left upper quadrant and left lower quadrant  There is no rigidity, no rebound, no guarding, no tenderness at McBurney's point and negative Buck's sign  Neurological: She is alert  Skin: Skin is warm and dry  Nursing note and vitals reviewed  Lab:  I have personally reviewed all pertinent results  Imaging:  I have personally reviewed all pertinent results  EKG, Pathology, and Other Studies:   I have personally reviewed all pertinent results  Assessment/Plan   Irritable bowel syndrome with both constipation and diarrhea  Counseled patient to keep a food journal to associate intake to symptoms  Counseled regarding stress, exercise, and nutrition  Continue to avoid gluten & lactose  RTC 4 weeks if not improving or identifying triggers  Rx Bentyl for cramping  Advised OTC medications that may help, and argenis, lemongrass, peppermint    Iris was seen today for gi problem, diarrhea and constipation  Diagnoses and all orders for this visit:    Irritable bowel syndrome with both constipation and diarrhea  -     dicyclomine (BENTYL) 20 mg tablet; Take 1 tablet (20 mg total) by mouth every 6 (six) hours    Other orders  -     Cancel: docusate sodium (COLACE) 100 mg capsule; Take 1 capsule (100 mg total) by mouth 2 (two) times a day          - PCP: Christiano Rodriguez MD  - Follow-up appointments: [facility]    No future appointments    _____________________________________________________________________   The attending physician, Dr Priya Morales, agreed with the plan  Brendan Crowley MD, PGY-3  [facility]   9/6/2018        Please be aware that this note contains text that was dictated and there may be errors pertaining to "sound-alike "words during the dictation process

## 2018-09-06 NOTE — ASSESSMENT & PLAN NOTE
Counseled patient to keep a food journal to associate intake to symptoms  Counseled regarding stress, exercise, and nutrition  Continue to avoid gluten & lactose  RTC 4 weeks if not improving or identifying triggers  Rx Bentyl for cramping  Advised OTC medications that may help, and argenis, lemongrass, peppermint

## 2018-11-09 ENCOUNTER — OFFICE VISIT (OUTPATIENT)
Dept: FAMILY MEDICINE CLINIC | Facility: CLINIC | Age: 23
End: 2018-11-09
Payer: COMMERCIAL

## 2018-11-09 ENCOUNTER — HOSPITAL ENCOUNTER (OUTPATIENT)
Dept: RADIOLOGY | Facility: HOSPITAL | Age: 23
Discharge: HOME | End: 2018-11-09
Payer: COMMERCIAL

## 2018-11-09 VITALS
HEART RATE: 78 BPM | HEIGHT: 63 IN | BODY MASS INDEX: 31.61 KG/M2 | DIASTOLIC BLOOD PRESSURE: 80 MMHG | SYSTOLIC BLOOD PRESSURE: 110 MMHG | TEMPERATURE: 99.1 F | WEIGHT: 178.4 LBS | RESPIRATION RATE: 18 BRPM

## 2018-11-09 DIAGNOSIS — M54.6 ACUTE MIDLINE THORACIC BACK PAIN: ICD-10-CM

## 2018-11-09 DIAGNOSIS — M54.6 ACUTE MIDLINE THORACIC BACK PAIN: Primary | ICD-10-CM

## 2018-11-09 PROCEDURE — 3008F BODY MASS INDEX DOCD: CPT | Performed by: FAMILY MEDICINE

## 2018-11-09 PROCEDURE — 72072 X-RAY EXAM THORAC SPINE 3VWS: CPT

## 2018-11-09 PROCEDURE — 99213 OFFICE O/P EST LOW 20 MIN: CPT | Performed by: FAMILY MEDICINE

## 2018-11-09 NOTE — PROGRESS NOTES
Assessment/Plan: Sarah Leonardo is a 26 y o  female with:   Problem List Items Addressed This Visit        Other    Acute midline thoracic back pain - Primary     Pain origin reported at level of T4-6, sudden onset on bending and lifting heavy (30 pound object at work) with associated feeling of "something tugged", initially nonadapting, now it radiates to bilateral L/E with no other FND  Responds to NSAIDs but overall progessively worsening  VS wnl, NO FND on exam  Likely etiology herniated disk vs ligament or muscle strain  Patient counseled on work restriction on heavy lifting, and continue NSAIDs and Tylenol for pain relief  Strict return precaution discussed  Thoracic Spine Xray ordered  Will call patient with results  RTC PRN             Relevant Orders    XR spine thoracic 3 vw        Chief Complaint:  Chief Complaint   Patient presents with    Back Pain     inbetween shoulder blades      HPI:   Sarah Leonardo is a 26 y o  female for evaluation of upper back pain as below  Patient denies taking any chronic scheduled medications  She is sexually active  LMP was last month  Uses barrier contraceptives  Back Pain   This is a new problem  The current episode started in the past 7 days  The problem occurs constantly  The problem has been gradually worsening since onset  The pain is present in the thoracic spine  The quality of the pain is described as burning and shooting  The pain radiates to the left knee, right thigh, left thigh and right knee  The pain is at a severity of 7/10  The pain is moderate  The pain is worse during the day  The symptoms are aggravated by bending  Pertinent negatives include no bladder incontinence, bowel incontinence, fever, leg pain, numbness, paresis, paresthesias, perianal numbness, tingling or weakness  She has tried NSAIDs for the symptoms  The treatment provided moderate relief       Current Outpatient Prescriptions   Medication Sig Dispense Refill    albuterol (VENTOLIN HFA) 90 mcg/act inhaler Inhale 2 puffs every 4 (four) hours as needed      budesonide (PULMICORT FLEXHALER) 90 MCG/ACT inhaler Inhale 1 puff daily      dicyclomine (BENTYL) 20 mg tablet Take 1 tablet (20 mg total) by mouth every 6 (six) hours 30 tablet 0    loratadine (CLARITIN) 10 mg tablet Take 1 tablet by mouth daily      Methylprednisolone 4 MG TBPK Use as directed on package (Patient not taking: Reported on 9/6/2018 ) 21 tablet 0     No current facility-administered medications for this visit  Past Medical History:   Diagnosis Date    Allergic rhinitis     Anisometropia 9/25/2015    Asthma      Social History   Substance Use Topics    Smoking status: Never Smoker    Smokeless tobacco: Never Used    Alcohol use 2 4 oz/week     4 Glasses of wine per week      Comment: yearly     Patient Active Problem List   Diagnosis    Insomnia    Moderate persistent asthma without complication    Nasal polyp    Tinea versicolor    Anisometropia    Irritable bowel syndrome with both constipation and diarrhea    Acute midline thoracic back pain       ROS:  As Per HPI    Physical Exam:  /80   Pulse 78   Temp 99 1 °F (37 3 °C)   Resp 18   Ht 5' 3 2" (1 605 m)   Wt 80 9 kg (178 lb 6 4 oz)   LMP 10/14/2018 (Approximate)   BMI 31 40 kg/m²   Physical Exam   Constitutional: She is oriented to person, place, and time  She appears well-developed and well-nourished  No distress  HENT:   Head: Normocephalic and atraumatic  Cardiovascular: Normal rate, regular rhythm and normal heart sounds  No murmur heard  Pulmonary/Chest: Effort normal and breath sounds normal  No respiratory distress  She has no wheezes  Abdominal: Soft  Normal appearance and bowel sounds are normal  There is no tenderness  Musculoskeletal: Normal range of motion  She exhibits no edema, tenderness or deformity     No point tenderness to deformities along thoracic spine  SLR negative (radiating pain non-reproducible)     Neurological: She is alert and oriented to person, place, and time  She has normal reflexes  She displays normal reflexes  No cranial nerve deficit  She exhibits normal muscle tone  Coordination normal    Skin: She is not diaphoretic  Vitals reviewed  No future appointments      Vicente Camacho MD

## 2018-11-09 NOTE — ASSESSMENT & PLAN NOTE
Pain origin reported at level of T4-6, sudden onset on bending and lifting heavy (30 pound object at work) with associated feeling of "something tugged", initially nonadapting, now it radiates to bilateral L/E with no other FND  Responds to NSAIDs but overall progessively worsening  VS wnl, NO FND on exam  Likely etiology herniated disk vs ligament or muscle strain  Patient counseled on work restriction on heavy lifting, and continue NSAIDs and Tylenol for pain relief  Strict return precaution discussed  Thoracic Spine Xray ordered  Will call patient with results    RTC PRN

## 2018-12-04 ENCOUNTER — APPOINTMENT (EMERGENCY)
Dept: ULTRASOUND IMAGING | Facility: HOSPITAL | Age: 23
End: 2018-12-04
Payer: COMMERCIAL

## 2018-12-04 ENCOUNTER — HOSPITAL ENCOUNTER (EMERGENCY)
Facility: HOSPITAL | Age: 23
Discharge: HOME | End: 2018-12-04
Attending: EMERGENCY MEDICINE | Admitting: EMERGENCY MEDICINE
Payer: COMMERCIAL

## 2018-12-04 ENCOUNTER — OFFICE VISIT (OUTPATIENT)
Dept: URGENT CARE | Age: 23
End: 2018-12-04

## 2018-12-04 VITALS
RESPIRATION RATE: 18 BRPM | HEART RATE: 90 BPM | OXYGEN SATURATION: 98 % | SYSTOLIC BLOOD PRESSURE: 122 MMHG | WEIGHT: 185 LBS | TEMPERATURE: 98.1 F | DIASTOLIC BLOOD PRESSURE: 63 MMHG | BODY MASS INDEX: 34.04 KG/M2 | HEIGHT: 62 IN

## 2018-12-04 VITALS
BODY MASS INDEX: 34.04 KG/M2 | SYSTOLIC BLOOD PRESSURE: 134 MMHG | TEMPERATURE: 98.6 F | WEIGHT: 185 LBS | RESPIRATION RATE: 22 BRPM | DIASTOLIC BLOOD PRESSURE: 69 MMHG | HEART RATE: 90 BPM | HEIGHT: 62 IN | OXYGEN SATURATION: 98 %

## 2018-12-04 DIAGNOSIS — E04.9 THYROID ENLARGEMENT: Primary | ICD-10-CM

## 2018-12-04 DIAGNOSIS — R53.83 FATIGUE: Primary | ICD-10-CM

## 2018-12-04 DIAGNOSIS — R63.5 WEIGHT GAIN: ICD-10-CM

## 2018-12-04 LAB
ALBUMIN SERPL BCP-MCNC: 3.4 G/DL (ref 3.5–5)
ALP SERPL-CCNC: 59 U/L (ref 46–116)
ALT SERPL W P-5'-P-CCNC: 18 U/L (ref 12–78)
ANION GAP SERPL CALCULATED.3IONS-SCNC: 10 MMOL/L (ref 4–13)
AST SERPL W P-5'-P-CCNC: 19 U/L (ref 5–45)
BASOPHILS # BLD AUTO: 0.06 THOUSANDS/ΜL (ref 0–0.1)
BASOPHILS NFR BLD AUTO: 1 % (ref 0–1)
BILIRUB SERPL-MCNC: 0.2 MG/DL (ref 0.2–1)
BUN SERPL-MCNC: 9 MG/DL (ref 5–25)
CALCIUM SERPL-MCNC: 8.8 MG/DL (ref 8.3–10.1)
CHLORIDE SERPL-SCNC: 105 MMOL/L (ref 100–108)
CO2 SERPL-SCNC: 23 MMOL/L (ref 21–32)
CREAT SERPL-MCNC: 0.72 MG/DL (ref 0.6–1.3)
EOSINOPHIL # BLD AUTO: 0.32 THOUSAND/ΜL (ref 0–0.61)
EOSINOPHIL NFR BLD AUTO: 4 % (ref 0–6)
ERYTHROCYTE [DISTWIDTH] IN BLOOD BY AUTOMATED COUNT: 12.5 % (ref 11.6–15.1)
GFR SERPL CREATININE-BSD FRML MDRD: 120 ML/MIN/1.73SQ M
GLUCOSE SERPL-MCNC: 79 MG/DL (ref 65–140)
HCT VFR BLD AUTO: 39.2 % (ref 34.8–46.1)
HGB BLD-MCNC: 13 G/DL (ref 11.5–15.4)
IMM GRANULOCYTES # BLD AUTO: 0.02 THOUSAND/UL (ref 0–0.2)
IMM GRANULOCYTES NFR BLD AUTO: 0 % (ref 0–2)
LYMPHOCYTES # BLD AUTO: 2.7 THOUSANDS/ΜL (ref 0.6–4.47)
LYMPHOCYTES NFR BLD AUTO: 33 % (ref 14–44)
MAGNESIUM SERPL-MCNC: 2.2 MG/DL (ref 1.6–2.6)
MCH RBC QN AUTO: 28.3 PG (ref 26.8–34.3)
MCHC RBC AUTO-ENTMCNC: 33.2 G/DL (ref 31.4–37.4)
MCV RBC AUTO: 85 FL (ref 82–98)
MONOCYTES # BLD AUTO: 0.66 THOUSAND/ΜL (ref 0.17–1.22)
MONOCYTES NFR BLD AUTO: 8 % (ref 4–12)
NEUTROPHILS # BLD AUTO: 4.49 THOUSANDS/ΜL (ref 1.85–7.62)
NEUTS SEG NFR BLD AUTO: 54 % (ref 43–75)
NRBC BLD AUTO-RTO: 0 /100 WBCS
PLATELET # BLD AUTO: 201 THOUSANDS/UL (ref 149–390)
PMV BLD AUTO: 11.6 FL (ref 8.9–12.7)
POTASSIUM SERPL-SCNC: 3.8 MMOL/L (ref 3.5–5.3)
PROT SERPL-MCNC: 7.1 G/DL (ref 6.4–8.2)
RBC # BLD AUTO: 4.59 MILLION/UL (ref 3.81–5.12)
SODIUM SERPL-SCNC: 138 MMOL/L (ref 136–145)
T3 SERPL-MCNC: 1.4 NG/ML (ref 0.6–1.8)
T4 FREE SERPL-MCNC: 0.97 NG/DL (ref 0.76–1.46)
TSH SERPL DL<=0.05 MIU/L-ACNC: 2.76 UIU/ML (ref 0.36–3.74)
WBC # BLD AUTO: 8.25 THOUSAND/UL (ref 4.31–10.16)

## 2018-12-04 PROCEDURE — 80053 COMPREHEN METABOLIC PANEL: CPT | Performed by: EMERGENCY MEDICINE

## 2018-12-04 PROCEDURE — 36415 COLL VENOUS BLD VENIPUNCTURE: CPT | Performed by: EMERGENCY MEDICINE

## 2018-12-04 PROCEDURE — 83735 ASSAY OF MAGNESIUM: CPT | Performed by: EMERGENCY MEDICINE

## 2018-12-04 PROCEDURE — 99284 EMERGENCY DEPT VISIT MOD MDM: CPT

## 2018-12-04 PROCEDURE — 85025 COMPLETE CBC W/AUTO DIFF WBC: CPT | Performed by: EMERGENCY MEDICINE

## 2018-12-04 PROCEDURE — 76536 US EXAM OF HEAD AND NECK: CPT

## 2018-12-04 PROCEDURE — 84443 ASSAY THYROID STIM HORMONE: CPT | Performed by: EMERGENCY MEDICINE

## 2018-12-04 PROCEDURE — 84480 ASSAY TRIIODOTHYRONINE (T3): CPT | Performed by: EMERGENCY MEDICINE

## 2018-12-04 PROCEDURE — 84439 ASSAY OF FREE THYROXINE: CPT | Performed by: EMERGENCY MEDICINE

## 2018-12-04 NOTE — PATIENT INSTRUCTIONS
Options discussed with patient  Patient states she will go to the [facility] emergency department by private vehicle for further evaluation  Patient instructed to keep her appointment with her family physician on December 18, 2018, and question whether it can be moved up

## 2018-12-04 NOTE — ED NOTES
Ultrasound at bedside     [facility], RN  12/04/18 3715

## 2018-12-04 NOTE — ED PROVIDER NOTES
History  Chief Complaint   Patient presents with    Medical Problem     Patient reports neck pain, headaches, extreme fatigue  Reports her thyroid is enlarged from provider at urgent care  37-year-old female presents chief complaint of fatigue, chills, weight gain and neck swelling  Onset was approximately 2 weeks ago  Patient was seen at a local urgent care center referred here for thyroid workup  History provided by:  Patient   used: No    Fatigue   Severity:  Moderate  Onset quality:  Gradual  Duration:  2 weeks  Timing:  Constant  Progression:  Unchanged  Chronicity:  New  Context: not change in medication, not decreased sleep and not stress    Relieved by:  Nothing  Worsened by:  Nothing  Ineffective treatments:  Sleep  Associated symptoms: headaches    Associated symptoms: no chest pain, no diarrhea, no dysuria, no fever, no frequency, no lethargy, no nausea, no shortness of breath and no vomiting        Prior to Admission Medications   Prescriptions Last Dose Informant Patient Reported? Taking?    albuterol (VENTOLIN HFA) 90 mcg/act inhaler   Yes Yes   Sig: Inhale 2 puffs every 4 (four) hours as needed   budesonide (PULMICORT FLEXHALER) 90 MCG/ACT inhaler   Yes Yes   Sig: Inhale 1 puff daily   loratadine (CLARITIN) 10 mg tablet   Yes Yes   Sig: Take 1 tablet by mouth daily      Facility-Administered Medications: None       Past Medical History:   Diagnosis Date    Allergic rhinitis     Anisometropia 9/25/2015    Asthma        Past Surgical History:   Procedure Laterality Date    ADENOIDECTOMY      EAR SURGERY      external - cyst    MYRINGOTOMY W/ TUBES      SINUS SURGERY      TONSILLECTOMY      WISDOM TOOTH EXTRACTION         Family History   Problem Relation Age of Onset    Sickle cell trait Mother     Hypertension Father     Other Father         Overweight    Asthma Sister     Hypertension Sister     Sickle cell trait Sister     Sickle cell trait Brother  Cancer Family     Allergic rhinitis Family      I have reviewed and agree with the history as documented  Social History   Substance Use Topics    Smoking status: Never Smoker    Smokeless tobacco: Never Used    Alcohol use 2 4 oz/week     4 Glasses of wine per week      Comment: yearly        Review of Systems   Constitutional: Positive for fatigue and unexpected weight change  Negative for chills, diaphoresis and fever  HENT: Positive for sore throat  Respiratory: Negative for shortness of breath  Cardiovascular: Negative for chest pain and palpitations  Gastrointestinal: Negative for diarrhea, nausea and vomiting  Endocrine: Positive for cold intolerance  Genitourinary: Negative for dysuria and frequency  Skin: Negative for rash  Neurological: Positive for headaches  All other systems reviewed and are negative  Physical Exam  Physical Exam   Constitutional: She is oriented to person, place, and time  She appears well-developed and well-nourished  No distress  HENT:   Head: Normocephalic and atraumatic  Mouth/Throat: Oropharynx is clear and moist    Eyes: Pupils are equal, round, and reactive to light  EOM are normal    Neck: Normal range of motion  No JVD present  Diffuse swelling of the soft tissues of the neck     Cardiovascular: Normal rate, regular rhythm, normal heart sounds and intact distal pulses  Exam reveals no gallop and no friction rub  No murmur heard  Pulmonary/Chest: Effort normal and breath sounds normal  No respiratory distress  She has no wheezes  She has no rales  She exhibits no tenderness  Musculoskeletal: Normal range of motion  She exhibits no tenderness  Neurological: She is alert and oriented to person, place, and time  Skin: Skin is warm and dry  Psychiatric: She has a normal mood and affect  Her behavior is normal  Judgment and thought content normal    Nursing note and vitals reviewed        Vital Signs  ED Triage Vitals [12/04/18 1635]   Temperature Pulse Respirations Blood Pressure SpO2   98 1 °F (36 7 °C) 86 18 137/71 100 %      Temp Source Heart Rate Source Patient Position - Orthostatic VS BP Location FiO2 (%)   Oral Monitor Sitting Right arm --      Pain Score       8           Vitals:    12/04/18 1635 12/04/18 1723 12/04/18 1730 12/04/18 1800   BP: 137/71 129/69 123/64 122/63   Pulse: 86 84 78 90   Patient Position - Orthostatic VS: Sitting Sitting         Visual Acuity  Visual Acuity      Most Recent Value   L Pupil Size (mm)  4 [MD AWARE]   R Pupil Size (mm)  3 [MD AWARE]          ED Medications  Medications - No data to display    Diagnostic Studies  Results Reviewed     Procedure Component Value Units Date/Time    Comprehensive metabolic panel [[id number]] Collected:  12/04/18 1802    Lab Status: In process Specimen:  Blood from Arm, Right Updated:  12/04/18 1804    Magnesium [[id number]] Collected:  12/04/18 1802    Lab Status: In process Specimen:  Blood from Arm, Right Updated:  12/04/18 1804    TSH [[id number]]  (Normal) Collected:  12/04/18 1713    Lab Status:  Final result Specimen:  Blood from Hand, Left Updated:  12/04/18 1745     TSH 3RD GENERATON 2 761 uIU/mL     Narrative:         Patients undergoing fluorescein dye angiography may retain small amounts of fluorescein in the body for 48-72 hours post procedure  Samples containing fluorescein can produce falsely depressed TSH values  If the patient had this procedure,a specimen should be resubmitted post fluorescein clearance            The recommended reference ranges for TSH during pregnancy are as follows:  First trimester 0 1 to 2 5 uIU/mL  Second trimester  0 2 to 3 0 uIU/mL  Third trimester 0 3 to 3 0 uIU/m      CBC and differential [[id number]] Collected:  12/04/18 1718    Lab Status:  Final result Specimen:  Blood from Arm, Right Updated:  12/04/18 1723     WBC 8 25 Thousand/uL      RBC 4 59 Million/uL      Hemoglobin 13 0 g/dL      Hematocrit 39 2 %      MCV 85 fL MCH 28 3 pg      MCHC 33 2 g/dL      RDW 12 5 %      MPV 11 6 fL      Platelets 506 Thousands/uL      nRBC 0 /100 WBCs      Neutrophils Relative 54 %      Immat GRANS % 0 %      Lymphocytes Relative 33 %      Monocytes Relative 8 %      Eosinophils Relative 4 %      Basophils Relative 1 %      Neutrophils Absolute 4 49 Thousands/µL      Immature Grans Absolute 0 02 Thousand/uL      Lymphocytes Absolute 2 70 Thousands/µL      Monocytes Absolute 0 66 Thousand/µL      Eosinophils Absolute 0 32 Thousand/µL      Basophils Absolute 0 06 Thousands/µL     T4, free [[id number]] Collected:  12/04/18 1713    Lab Status: In process Specimen:  Blood from Hand, Left Updated:  12/04/18 1718    T3 [[id number]] Collected:  12/04/18 1713    Lab Status: In process Specimen:  Blood from Hand, Left Updated:  12/04/18 1718                 US thyroid   Final Result by Suyapa Cross MD (12/04 1805)      Normal examination  Reference: ACR Thyroid Imaging, Reporting and Data System (TI-RADS): White Paper of the [place]   J AM Mo Radiol 4369;99:302-037  (additional recommendations based on American Thyroid Association 2015 guidelines )         Workstation performed: [id number]                    Procedures  Procedures       Phone Contacts  ED Phone Contact    ED Course                               MDM  Number of Diagnoses or Management Options  Fatigue: new and requires workup  Weight gain: new and requires workup  Diagnosis management comments: Background: 26 y o  female presents with fatigue, cold intolerance and unexpected weight gain with neck swelling    Differential DX includes but is not limited to: hypothyroidism, anemia, viral syndrome, excess caloric intake, metabolic derangement    Plan: cbc, cmp, thyroid studies, thyroid ultrasound          Amount and/or Complexity of Data Reviewed  Clinical lab tests: ordered and reviewed  Tests in the radiology section of CPT®: ordered and reviewed    Risk of Complications, Morbidity, and/or Mortality  Presenting problems: high  Diagnostic procedures: high  Management options: high    Patient Progress  Patient progress: stable    CritCare Time    Disposition  Final diagnoses:   Fatigue   Weight gain     Time reflects when diagnosis was documented in both MDM as applicable and the Disposition within this note     Time User Action Codes Description Comment    12/4/2018  6:10 PM Nikki Lopezeduard Russell [R53 83] Fatigue     12/4/2018  6:10 PM Loreto Mckeon [R63 5] Weight gain       ED Disposition     ED Disposition Condition Comment    Discharge  [facility] discharge to home/self care  Condition at discharge: Good        Follow-up Information    None         Patient's Medications   Discharge Prescriptions    No medications on file     No discharge procedures on file      ED Provider  Electronically Signed by           Carina Kathleen MD  12/04/18 9848

## 2018-12-04 NOTE — PROGRESS NOTES
[facility]        NAME: Sussy Luis is a 26 y o  female  : 1995    MRN: [id number]  DATE: 2018  TIME: 4:11 PM    Assessment and Plan   Thyroid enlargement [E04 9]  1  Thyroid enlargement  Transfer to other facility         Patient Instructions     Patient Instructions   Options discussed with patient  Patient states she will go to the [facility] emergency department by private vehicle for further evaluation  Patient instructed to keep her appointment with her family physician on 2018, and question whether it can be moved up  Chief Complaint     Chief Complaint   Patient presents with    Thyroid Problem     headache, swollen neck, weight gain,          History of Present Illness       Patient with weight gain, prominent thyroid, and headaches for the past 2-3 weeks (patient denies injury her any illness); patient states she called her family physician and was given an appointment on 2018; the patient states she has a history of irritable bowel and loose stools; patient also states that now that she is 23 if she has 2 drinks she gets sick to her stomach        Review of Systems   Review of Systems   Constitutional: Positive for unexpected weight change  Weight gain   HENT:        Prominent thyroid   Respiratory: Negative  Cardiovascular: Negative  Gastrointestinal: Positive for diarrhea  Genitourinary: Negative  Musculoskeletal: Negative  Skin: Negative  Neurological: Positive for headaches           Current Medications       Current Outpatient Prescriptions:     albuterol (VENTOLIN HFA) 90 mcg/act inhaler, Inhale 2 puffs every 4 (four) hours as needed, Disp: , Rfl:     budesonide (PULMICORT FLEXHALER) 90 MCG/ACT inhaler, Inhale 1 puff daily, Disp: , Rfl:     loratadine (CLARITIN) 10 mg tablet, Take 1 tablet by mouth daily, Disp: , Rfl:     dicyclomine (BENTYL) 20 mg tablet, Take 1 tablet (20 mg total) by mouth every 6 (six) hours (Patient not taking: Reported on 12/4/2018 ), Disp: 30 tablet, Rfl: 0    Methylprednisolone 4 MG TBPK, Use as directed on package (Patient not taking: Reported on 9/6/2018 ), Disp: 21 tablet, Rfl: 0    Current Allergies     Allergies as of 12/04/2018 - Reviewed 12/04/2018   Allergen Reaction Noted    Cat hair extract Hives and Itching 05/09/2014    Other Itching and Nasal Congestion 05/09/2014    Dog epithelium Hives and Itching 05/09/2014    Tree extract Itching and Nasal Congestion 05/09/2014            The following portions of the patient's history were reviewed and updated as appropriate: allergies, current medications, past family history, past medical history, past social history, past surgical history and problem list      Past Medical History:   Diagnosis Date    Allergic rhinitis     Anisometropia 9/25/2015    Asthma        Past Surgical History:   Procedure Laterality Date    ADENOIDECTOMY      EAR SURGERY      external - cyst    MYRINGOTOMY W/ TUBES      SINUS SURGERY      TONSILLECTOMY      WISDOM TOOTH EXTRACTION         Family History   Problem Relation Age of Onset    Sickle cell trait Mother     Hypertension Father     Other Father         Overweight    Asthma Sister     Hypertension Sister     Sickle cell trait Sister     Sickle cell trait Brother     Cancer Family     Allergic rhinitis Family          Medications have been verified  Objective   /69   Pulse 90   Temp 98 6 °F (37 °C)   Resp 22   Ht 5' 2" (1 575 m)   Wt 83 9 kg (185 lb)   LMP 11/22/2018   SpO2 98%   BMI 33 84 kg/m²        Physical Exam     Physical Exam   Constitutional: She is oriented to person, place, and time  She appears well-developed and well-nourished  HENT:   Right Ear: External ear normal    Left Ear: External ear normal    Mouth/Throat: Oropharynx is clear and moist    Neck: Normal range of motion  Neck supple  Thyromegaly present  Prominent left lobe of thyroid   Cardiovascular: Normal rate, regular rhythm and normal heart sounds  Pulmonary/Chest: Effort normal and breath sounds normal    Abdominal: Soft  There is no tenderness  There is no guarding  Neurological: She is alert and oriented to person, place, and time  No nuchal rigidity   Skin: Skin is warm  Good color and turgor   Psychiatric: She has a normal mood and affect  Her behavior is normal    Nursing note and vitals reviewed

## 2018-12-04 NOTE — DISCHARGE INSTRUCTIONS
Fatigue, Ambulatory Care   GENERAL INFORMATION:   Fatigue  is mental and physical exhaustion that does not get better with rest  It may interfere with your daily activities and can cause extreme sleepiness  Although it is normal to feel tired sometimes, long-term fatigue may be a sign of serious illness  Seek immediate care for the following symptoms:   · Difficulty breathing    · Chest pain  Management and treatment for fatigue includes the following:   · Keep a fatigue diary  to help you find out what makes you feel more or less tired  · Exercise as directed  Ask your healthcare provider about the best exercise plan for you  Even moderate exercise may decrease your fatigue  · Keep a regular schedule  Go to bed at the same time every night and limit naps  · Eat healthy foods  including fruits, vegetables, whole-grain breads, low-fat dairy products, beans, lean meats, and fish  Ask if you need to be on a special diet  · Limit caffeine and alcohol  because they can interfere with your sleep  Women should limit alcohol to 1 drink a day  Men should limit alcohol to 2 drinks a day  A drink of alcohol is 12 ounces of beer, 5 ounces of wine, or 1½ ounces of liquor  Ask your healthcare provider how much caffeine is safe for you  · Do not smoke  Smoking can cause health problems that make you tired  If you smoke, it is never too late to quit  Ask your healthcare provider for information if you need help quitting  Follow up with your healthcare provider as directed:  Write down your questions so you remember to ask them during your visits  CARE AGREEMENT:   You have the right to help plan your care  Learn about your health condition and how it may be treated  Discuss treatment options with your caregivers to decide what care you want to receive  You always have the right to refuse treatment  The above information is an  only   It is not intended as medical advice for individual conditions or treatments  Talk to your doctor, nurse or pharmacist before following any medical regimen to see if it is safe and effective for you  © 2014 [address] is for End User's use only and may not be sold, redistributed or otherwise used for commercial purposes  All illustrations and images included in CareNotes® are the copyrighted property of [employer] Inc  or [employer]

## 2018-12-18 ENCOUNTER — TELEPHONE (OUTPATIENT)
Dept: FAMILY MEDICINE CLINIC | Facility: CLINIC | Age: 23
End: 2018-12-18

## 2018-12-18 ENCOUNTER — OFFICE VISIT (OUTPATIENT)
Dept: FAMILY MEDICINE CLINIC | Facility: CLINIC | Age: 23
End: 2018-12-18
Payer: COMMERCIAL

## 2018-12-18 VITALS
HEART RATE: 82 BPM | RESPIRATION RATE: 16 BRPM | TEMPERATURE: 97.8 F | SYSTOLIC BLOOD PRESSURE: 110 MMHG | BODY MASS INDEX: 33.01 KG/M2 | DIASTOLIC BLOOD PRESSURE: 70 MMHG | WEIGHT: 179.4 LBS | HEIGHT: 62 IN

## 2018-12-18 DIAGNOSIS — Z00.00 HEALTHCARE MAINTENANCE: Primary | ICD-10-CM

## 2018-12-18 DIAGNOSIS — J30.81 ALLERGIC RHINITIS DUE TO ANIMAL HAIR AND DANDER: ICD-10-CM

## 2018-12-18 DIAGNOSIS — E66.9 OBESITY (BMI 30.0-34.9): ICD-10-CM

## 2018-12-18 DIAGNOSIS — Z30.019 ENCOUNTER FOR INITIAL PRESCRIPTION OF CONTRACEPTIVES, UNSPECIFIED CONTRACEPTIVE: ICD-10-CM

## 2018-12-18 DIAGNOSIS — J45.40 MODERATE PERSISTENT ASTHMA WITHOUT COMPLICATION: ICD-10-CM

## 2018-12-18 DIAGNOSIS — Z23 NEED FOR INFLUENZA VACCINATION: ICD-10-CM

## 2018-12-18 PROCEDURE — 99213 OFFICE O/P EST LOW 20 MIN: CPT | Performed by: FAMILY MEDICINE

## 2018-12-18 PROCEDURE — 3008F BODY MASS INDEX DOCD: CPT | Performed by: FAMILY MEDICINE

## 2018-12-18 PROCEDURE — 99395 PREV VISIT EST AGE 18-39: CPT | Performed by: FAMILY MEDICINE

## 2018-12-18 PROCEDURE — 3725F SCREEN DEPRESSION PERFORMED: CPT | Performed by: FAMILY MEDICINE

## 2018-12-18 RX ORDER — FLUTICASONE PROPIONATE 50 MCG
1 SPRAY, SUSPENSION (ML) NASAL DAILY
Qty: 16 G | Refills: 3 | Status: SHIPPED | OUTPATIENT
Start: 2018-12-18 | End: 2020-03-07 | Stop reason: ALTCHOICE

## 2018-12-18 RX ORDER — LORATADINE 10 MG/1
10 TABLET ORAL DAILY
Qty: 90 TABLET | Refills: 3 | Status: SHIPPED | OUTPATIENT
Start: 2018-12-18

## 2018-12-18 RX ORDER — ALBUTEROL SULFATE 90 UG/1
2 AEROSOL, METERED RESPIRATORY (INHALATION) EVERY 4 HOURS PRN
Qty: 1 INHALER | Refills: 3 | Status: SHIPPED | OUTPATIENT
Start: 2018-12-18

## 2018-12-18 NOTE — PROGRESS NOTES
Jessy Samuel 1995 female MRN: [id number]    Health Maintenance Visit    SUBJECTIVE    HPI:  Jessy Samuel is a 26 y o  female who presented for a routine health maintenance visit  3 male partner, always uses condoms  Never had STI  She is concerned about her thyroid - maternal grandmother had some form of thyroid disease  She would also like to follow up about her Asthma and needs refills:     Asthma Current Disease Severity  The patient is having daytime symptoms less than or equal to 2 days per week  The patient is having nighttime symptoms more than once per week, but not nightly  The patient is using short-acting beta agonists for symptom control more than 2 days per week but not more than once a day  They have exacerbations requiring oral systemic corticosteroids 1 times per year  Current limitations in activity from asthma: none  Number of days of school or work missed in the last month: 0  Number of urgent/emergent visit in last year: 1  The patient is not using a spacer with MDIs  The patient reports their overall control of asthma well controlled    She has triggers with exercise and cat hair  Cats sleep with her  Health Maintenance   Topic Date Due    PAP SMEAR  07/19/2018    INFLUENZA VACCINE  12/18/2019 (Originally 7/1/2018)    DTaP,Tdap,and Td Vaccines (7 - Td) 03/12/2019    Depression Screening PHQ  08/08/2019     CRC screening: No personal or family history of colon cancer or colon polyps  BrCa screening: There is no personal or family history of breast cancer  She denies finding new breast lumps, breast pain or nipple discharge  CVS screening: Patient denies any exertional chest pain, dyspnea, palpitations, syncope, orthopnea, edema or paroxysmal nocturnal dyspnea  DM screening: No polyuria, polydipsia, blurry vision, chest pain, dyspnea or claudication  No foot burning, numbness or pain    No personal or family history of skin cancers or melanoma  PHQ-9 Depression Screening    PHQ-9:    Frequency of the following problems over the past two weeks:       Little interest or pleasure in doing things:  0 - not at all  Feeling down, depressed, or hopeless:  0 - not at all  PHQ-2 Score:  0       Review of Systems   Constitutional: Negative for activity change, chills and fever  HENT: Negative for congestion, rhinorrhea and sore throat  Eyes: Negative for visual disturbance  Respiratory: Positive for wheezing  Negative for cough and shortness of breath  Cardiovascular: Negative for chest pain and palpitations  Gastrointestinal: Negative for abdominal pain, blood in stool, constipation, diarrhea, nausea and vomiting  Genitourinary: Negative for dysuria and vaginal bleeding  Musculoskeletal: Negative for arthralgias and myalgias  Skin: Negative for rash  Neurological: Negative for dizziness, weakness and headaches  Psychiatric/Behavioral: Negative for dysphoric mood  All other systems reviewed and are negative      Historical Information   Past Medical History:   Diagnosis Date    Allergic rhinitis     Anisometropia 9/25/2015    Asthma      Past Surgical History:   Procedure Laterality Date    ADENOIDECTOMY      EAR SURGERY      external - cyst    MYRINGOTOMY W/ TUBES      SINUS SURGERY      TONSILLECTOMY      WISDOM TOOTH EXTRACTION       Family History   Problem Relation Age of Onset    Sickle cell trait Mother     Hypertension Father     Other Father         Overweight    Asthma Sister     Hypertension Sister     Sickle cell trait Sister     Sickle cell trait Brother     Cancer Family     Allergic rhinitis Family      Social History   History   Alcohol Use    2 4 oz/week    4 Glasses of wine per week     Comment: yearly     History   Drug Use No     History   Smoking Status    Never Smoker   Smokeless Tobacco    Never Used       Medications:      Current Outpatient Prescriptions:     albuterol (VENTOLIN HFA) 90 mcg/act inhaler, Inhale 2 puffs every 4 (four) hours as needed, Disp: , Rfl:     budesonide (PULMICORT FLEXHALER) 90 MCG/ACT inhaler, Inhale 1 puff daily, Disp: , Rfl:     loratadine (CLARITIN) 10 mg tablet, Take 1 tablet by mouth daily, Disp: , Rfl:     Allergies   Allergen Reactions    Cat Hair Extract Hives and Itching     Hives - with contact    Other Itching and Nasal Congestion     Grass    Dog Epithelium Hives and Itching     Hives on contact    Tree Extract Itching and Nasal Congestion       OBJECTIVE  Vitals:   Vitals:    12/18/18 0858   BP: 110/70   Pulse: 82   Resp: 16   Temp: 97 8 °F (36 6 °C)   Weight: 81 4 kg (179 lb 6 4 oz)   Height: 5' 2" (1 575 m)     Wt Readings from Last 3 Encounters:   12/18/18 81 4 kg (179 lb 6 4 oz)   12/04/18 83 9 kg (185 lb)   12/04/18 83 9 kg (185 lb)     Body mass index is 32 81 kg/m²  Temp Readings from Last 3 Encounters:   12/18/18 97 8 °F (36 6 °C)   12/04/18 98 1 °F (36 7 °C) (Oral)   12/04/18 98 6 °F (37 °C)     BP Readings from Last 3 Encounters:   12/18/18 110/70   12/04/18 122/63   12/04/18 134/69     Pulse Readings from Last 3 Encounters:   12/18/18 82   12/04/18 90   12/04/18 90     Patient's last menstrual period was 11/22/2018  Physical Exam   Constitutional: She is oriented to person, place, and time  She appears well-developed and well-nourished  HENT:   Head: Normocephalic and atraumatic  Eyes: Conjunctivae and EOM are normal    Neck: Thyromegaly (mild fullness, no palpable nodules ) present  Cardiovascular: Normal rate, regular rhythm, normal heart sounds and intact distal pulses  No murmur heard  Pulmonary/Chest: Effort normal and breath sounds normal  No respiratory distress  She has no decreased breath sounds  She has no wheezes  She has no rhonchi  Abdominal: Soft  Bowel sounds are normal  She exhibits no distension  There is no tenderness  Musculoskeletal: She exhibits no edema     Neurological: She is alert and oriented to person, place, and time  Skin: Skin is warm and dry  Psychiatric: She has a normal mood and affect  Her behavior is normal    Nursing note and vitals reviewed  Lab:  I have personally reviewed all pertinent results  Imaging:  I have personally reviewed all pertinent results  Assessment/Plan     Healthcare maintenance  Counseled on nutrition and exercise    Encounter for initial prescription of contraceptives  Patient asks for GYN referral  Initial counseling regarding contraception options provided  Patient may go to GYN for care or may return here for PAP     Moderate persistent asthma without complication  Patient was seen for asthma f/u today  She is have symptoms 2x weekly, and almost nightly awakenings due to cat allergen exposures  1 exacerbation in the last year requiring steroids  She will continue Pulmicort and albuterol prn  She will consider starting Singulair and call if she desires this  Counseled regarding exacerbation management  Suggested trials of Flonase to see if this also helps for HS symptoms    Jessica Mittal was seen today for physical exam     Diagnoses and all orders for this visit:    Healthcare maintenance    Need for influenza vaccination  -     SYRINGE/SINGLE-DOSE VIAL: influenza vaccine, [id number], quadrivalent, 0 5 mL, preservative-free ([place], [address] [facility])    Encounter for initial prescription of contraceptives, unspecified contraceptive  -     Ambulatory referral to Obstetrics / Gynecology; Future    Obesity (BMI 30 0-34  9)    Moderate persistent asthma without complication        No orders of the defined types were placed in this encounter  In addition to the above, the patient was counseled on general preventative health care subjects, including but not limited to:  - Nutrition, healthy weight, aerobic and weight-bearing exercise  - Mental health, social support, and self care  - Advised of the importance of dental hygiene and routine dental visits    - Patient made aware of  services at the office  Full counseling on the many choices of family planning methods including condoms most of the time is provided, and all questions answered  Compliance is strongly emphasized  Most Recent Immunizations   Administered Date(s) Administered    DTP 10/17/1998    DTaP 5 07/30/2001    Hep A, adult 05/26/2011    Hep B, adult 05/02/1998    HiB 01/31/1998    Hib (PRP-OMP) 10/17/1998    IPV 07/30/2001    Influenza 10/02/2017    MMR 07/30/2001    Meningococcal, Unknown Serogroups 08/08/2014    Tdap 03/12/2009    Tuberculin Skin Test-PPD Intradermal 08/18/2017    Varicella 04/03/2010     Immunization status: up to date and documented  [facility] for [facility]  visit  PCP: Faye Hendricks MD    No future appointments       _____________________________________________________________________   The attending physician, Dr Mary Douglas, agreed with the plan  Faye Hendricks MD, PGY-3  [facility]   12/18/2018     Please be aware that this note contains text that was dictated and there may be errors pertaining to "sound-alike "words during the dictation process  BMI Counseling: Body mass index is 32 81 kg/m²  Discussed the patient's BMI with her  The BMI is above average  BMI counseling and education was provided to the patient  Nutrition recommendations include reducing portion sizes, 3-5 servings of fruits/vegetables daily and reducing fast food intake  Exercise recommendations include moderate aerobic physical activity for 150 minutes/week

## 2018-12-18 NOTE — PATIENT INSTRUCTIONS
Wellness Visit for Adults   AMBULATORY CARE:   A wellness visit  is when you see your healthcare provider to get screened for health problems  You can also get advice on how to stay healthy  Write down your questions so you remember to ask them  Ask your healthcare provider how often you should have a wellness visit  What happens at a wellness visit:  Your healthcare provider will ask about your health, and your family history of health problems  This includes high blood pressure, heart disease, and cancer  He or she will ask if you have symptoms that concern you, if you smoke, and about your mood  You may also be asked about your intake of medicines, supplements, food, and alcohol  Any of the following may be done:  · Your weight  will be checked  Your height may also be checked so your body mass index (BMI) can be calculated  Your BMI shows if you are at a healthy weight  · Your blood pressure  and heart rate will be checked  Your temperature may also be checked  · Blood and urine tests  may be done  Blood tests may be done to check your cholesterol levels  Abnormal cholesterol levels increase your risk for heart disease and stroke  You may also need a blood or urine test to check for diabetes if you are at increased risk  Urine tests may be done to look for signs of an infection or kidney disease  · A physical exam  includes checking your heartbeat and lungs with a stethoscope  Your healthcare provider may also check your skin to look for sun damage  · Screening tests  may be recommended  A screening test is done to check for diseases that may not cause symptoms  The screening tests you may need depend on your age, gender, family history, and lifestyle habits  For example, colorectal screening may be recommended if you are 48years old or older  Screening tests you need if you are a woman:   · A Pap smear  is used to screen for cervical cancer   Pap smears are usually done every 3 to 5 years depending on your age  You may need them more often if you have had abnormal Pap smear test results in the past  Ask your healthcare provider how often you should have a Pap smear  · A mammogram  is an x-ray of your breasts to screen for breast cancer  Experts recommend mammograms every 2 years starting at age 48 years  You may need a mammogram at age 52 years or younger if you have an increased risk for breast cancer  Talk to your healthcare provider about when you should start having mammograms and how often you need them  Vaccines you may need:   · Get an influenza vaccine  every year  The influenza vaccine protects you from the flu  Several types of viruses cause the flu  The viruses change over time, so new vaccines are made each year  · Get a tetanus-diphtheria (Td) booster vaccine  every 10 years  This vaccine protects you against tetanus and diphtheria  Tetanus is a severe infection that may cause painful muscle spasms and lockjaw  Diphtheria is a severe bacterial infection that causes a thick covering in the back of your mouth and throat  · Get a human papillomavirus (HPV) vaccine  if you are female and aged 23 to 32 or male 23 to 24 and never received it  This vaccine protects you from HPV infection  HPV is the most common infection spread by sexual contact  HPV may also cause vaginal, penile, and anal cancers  · Get a pneumococcal vaccine  if you are aged 72 years or older  The pneumococcal vaccine is an injection given to protect you from pneumococcal disease  Pneumococcal disease is an infection caused by pneumococcal bacteria  The infection may cause pneumonia, meningitis, or an ear infection  · Get a shingles vaccine  if you are aged 61 or older, even if you have had shingles before  The shingles vaccine is an injection to protect you from the varicella-zoster virus  This is the same virus that causes chickenpox   Shingles is a painful rash that develops in people who had chickenpox or have been exposed to the virus  How to eat healthy:  My Plate is a model for planning healthy meals  It shows the types and amounts of foods that should go on your plate  Fruits and vegetables make up about half of your plate, and grains and protein make up the other half  A serving of dairy is included on the side of your plate  The amount of calories and serving sizes you need depends on your age, gender, weight, and height  Examples of healthy foods are listed below:  · Eat a variety of vegetables  such as dark green, red, and orange vegetables  You can also include canned vegetables low in sodium (salt) and frozen vegetables without added butter or sauces  · Eat a variety of fresh fruits , canned fruit in 100% juice, frozen fruit, and dried fruit  · Include whole grains  At least half of the grains you eat should be whole grains  Examples include whole-wheat bread, wheat pasta, brown rice, and whole-grain cereals such as oatmeal     · Eat a variety of protein foods such as seafood (fish and shellfish), lean meat, and poultry without skin (turkey and chicken)  Examples of lean meats include pork leg, shoulder, or tenderloin, and beef round, sirloin, tenderloin, and extra lean ground beef  Other protein foods include eggs and egg substitutes, beans, peas, soy products, nuts, and seeds  · Choose low-fat dairy products such as skim or 1% milk or low-fat yogurt, cheese, and cottage cheese  · Limit unhealthy fats  such as butter, hard margarine, and shortening  Exercise:  Exercise at least 30 minutes per day on most days of the week  Some examples of exercise include walking, biking, dancing, and swimming  You can also fit in more physical activity by taking the stairs instead of the elevator or parking farther away from stores  Include muscle strengthening activities 2 days each week  Regular exercise provides many health benefits   It helps you manage your weight, and decreases your risk for type 2 diabetes, heart disease, stroke, and high blood pressure  Exercise can also help improve your mood  Ask your healthcare provider about the best exercise plan for you  General health and safety guidelines:   · Do not smoke  Nicotine and other chemicals in cigarettes and cigars can cause lung damage  Ask your healthcare provider for information if you currently smoke and need help to quit  E-cigarettes or smokeless tobacco still contain nicotine  Talk to your healthcare provider before you use these products  · Limit alcohol  A drink of alcohol is 12 ounces of beer, 5 ounces of wine, or 1½ ounces of liquor  · Lose weight, if needed  Being overweight increases your risk of certain health conditions  These include heart disease, high blood pressure, type 2 diabetes, and certain types of cancer  · Protect your skin  Do not sunbathe or use tanning beds  Use sunscreen with a SPF 15 or higher  Apply sunscreen at least 15 minutes before you go outside  Reapply sunscreen every 2 hours  Wear protective clothing, hats, and sunglasses when you are outside  · Drive safely  Always wear your seatbelt  Make sure everyone in your car wears a seatbelt  A seatbelt can save your life if you are in an accident  Do not use your cell phone when you are driving  This could distract you and cause an accident  Pull over if you need to make a call or send a text message  · Practice safe sex  Use latex condoms if are sexually active and have more than one partner  Your healthcare provider may recommend screening tests for sexually transmitted infections (STIs)  · Wear helmets, lifejackets, and protective gear  Always wear a helmet when you ride a bike or motorcycle, go skiing, or play sports that could cause a head injury  Wear protective equipment when you play sports  Wear a lifejacket when you are on a boat or doing water sports    © 2017 [employer] Information is for End User's use only and may not be sold, redistributed or otherwise used for commercial purposes  All illustrations and images included in CareNotes® are the copyrighted property of [employer]  or H[employer] Walker  The above information is an  only  It is not intended as medical advice for individual conditions or treatments  Talk to your doctor, nurse or pharmacist before following any medical regimen to see if it is safe and effective for you  Wellness Visit for Adults   AMBULATORY CARE:   A wellness visit  is when you see your healthcare provider to get screened for health problems  You can also get advice on how to stay healthy  Write down your questions so you remember to ask them  Ask your healthcare provider how often you should have a wellness visit  What happens at a wellness visit:  Your healthcare provider will ask about your health, and your family history of health problems  This includes high blood pressure, heart disease, and cancer  He or she will ask if you have symptoms that concern you, if you smoke, and about your mood  You may also be asked about your intake of medicines, supplements, food, and alcohol  Any of the following may be done:  · Your weight  will be checked  Your height may also be checked so your body mass index (BMI) can be calculated  Your BMI shows if you are at a healthy weight  · Your blood pressure  and heart rate will be checked  Your temperature may also be checked  · Blood and urine tests  may be done  Blood tests may be done to check your cholesterol levels  Abnormal cholesterol levels increase your risk for heart disease and stroke  You may also need a blood or urine test to check for diabetes if you are at increased risk  Urine tests may be done to look for signs of an infection or kidney disease  · A physical exam  includes checking your heartbeat and lungs with a stethoscope   Your healthcare provider may also check your skin to look for sun damage  · Screening tests  may be recommended  A screening test is done to check for diseases that may not cause symptoms  The screening tests you may need depend on your age, gender, family history, and lifestyle habits  For example, colorectal screening may be recommended if you are 48years old or older  Screening tests you need if you are a woman:   · A Pap smear  is used to screen for cervical cancer  Pap smears are usually done every 3 to 5 years depending on your age  You may need them more often if you have had abnormal Pap smear test results in the past  Ask your healthcare provider how often you should have a Pap smear  · A mammogram  is an x-ray of your breasts to screen for breast cancer  Experts recommend mammograms every 2 years starting at age 48 years  You may need a mammogram at age 52 years or younger if you have an increased risk for breast cancer  Talk to your healthcare provider about when you should start having mammograms and how often you need them  Vaccines you may need:   · Get an influenza vaccine  every year  The influenza vaccine protects you from the flu  Several types of viruses cause the flu  The viruses change over time, so new vaccines are made each year  · Get a tetanus-diphtheria (Td) booster vaccine  every 10 years  This vaccine protects you against tetanus and diphtheria  Tetanus is a severe infection that may cause painful muscle spasms and lockjaw  Diphtheria is a severe bacterial infection that causes a thick covering in the back of your mouth and throat  · Get a human papillomavirus (HPV) vaccine  if you are female and aged 23 to 32 or male 23 to 24 and never received it  This vaccine protects you from HPV infection  HPV is the most common infection spread by sexual contact  HPV may also cause vaginal, penile, and anal cancers  · Get a pneumococcal vaccine  if you are aged 72 years or older   The pneumococcal vaccine is an injection given to protect you from pneumococcal disease  Pneumococcal disease is an infection caused by pneumococcal bacteria  The infection may cause pneumonia, meningitis, or an ear infection  · Get a shingles vaccine  if you are aged 61 or older, even if you have had shingles before  The shingles vaccine is an injection to protect you from the varicella-zoster virus  This is the same virus that causes chickenpox  Shingles is a painful rash that develops in people who had chickenpox or have been exposed to the virus  How to eat healthy:  My Plate is a model for planning healthy meals  It shows the types and amounts of foods that should go on your plate  Fruits and vegetables make up about half of your plate, and grains and protein make up the other half  A serving of dairy is included on the side of your plate  The amount of calories and serving sizes you need depends on your age, gender, weight, and height  Examples of healthy foods are listed below:  · Eat a variety of vegetables  such as dark green, red, and orange vegetables  You can also include canned vegetables low in sodium (salt) and frozen vegetables without added butter or sauces  · Eat a variety of fresh fruits , canned fruit in 100% juice, frozen fruit, and dried fruit  · Include whole grains  At least half of the grains you eat should be whole grains  Examples include whole-wheat bread, wheat pasta, brown rice, and whole-grain cereals such as oatmeal     · Eat a variety of protein foods such as seafood (fish and shellfish), lean meat, and poultry without skin (turkey and chicken)  Examples of lean meats include pork leg, shoulder, or tenderloin, and beef round, sirloin, tenderloin, and extra lean ground beef  Other protein foods include eggs and egg substitutes, beans, peas, soy products, nuts, and seeds  · Choose low-fat dairy products such as skim or 1% milk or low-fat yogurt, cheese, and cottage cheese       · Limit unhealthy fats  such as butter, hard margarine, and shortening  Exercise:  Exercise at least 30 minutes per day on most days of the week  Some examples of exercise include walking, biking, dancing, and swimming  You can also fit in more physical activity by taking the stairs instead of the elevator or parking farther away from stores  Include muscle strengthening activities 2 days each week  Regular exercise provides many health benefits  It helps you manage your weight, and decreases your risk for type 2 diabetes, heart disease, stroke, and high blood pressure  Exercise can also help improve your mood  Ask your healthcare provider about the best exercise plan for you  General health and safety guidelines:   · Do not smoke  Nicotine and other chemicals in cigarettes and cigars can cause lung damage  Ask your healthcare provider for information if you currently smoke and need help to quit  E-cigarettes or smokeless tobacco still contain nicotine  Talk to your healthcare provider before you use these products  · Limit alcohol  A drink of alcohol is 12 ounces of beer, 5 ounces of wine, or 1½ ounces of liquor  · Lose weight, if needed  Being overweight increases your risk of certain health conditions  These include heart disease, high blood pressure, type 2 diabetes, and certain types of cancer  · Protect your skin  Do not sunbathe or use tanning beds  Use sunscreen with a SPF 15 or higher  Apply sunscreen at least 15 minutes before you go outside  Reapply sunscreen every 2 hours  Wear protective clothing, hats, and sunglasses when you are outside  · Drive safely  Always wear your seatbelt  Make sure everyone in your car wears a seatbelt  A seatbelt can save your life if you are in an accident  Do not use your cell phone when you are driving  This could distract you and cause an accident  Pull over if you need to make a call or send a text message  · Practice safe sex    Use latex condoms if are sexually active and have more than one partner  Your healthcare provider may recommend screening tests for sexually transmitted infections (STIs)  · Wear helmets, lifejackets, and protective gear  Always wear a helmet when you ride a bike or motorcycle, go skiing, or play sports that could cause a head injury  Wear protective equipment when you play sports  Wear a lifejacket when you are on a boat or doing water sports  © 2017 [facility] [facility] Information is for End User's use only and may not be sold, redistributed or otherwise used for commercial purposes  All illustrations and images included in CareNotes® are the copyrighted property of [employer]  or Hao Walker  The above information is an  only  It is not intended as medical advice for individual conditions or treatments  Talk to your doctor, nurse or pharmacist before following any medical regimen to see if it is safe and effective for you  Thank you for enrolling in [address] [place] [address]  Please follow the instructions below to securely access your online medical record  [employer] allows you to send messages to your doctor, view your test results, renew your prescriptions, schedule appointments, and more  [facility] uses Single Sign on (SSO) Technology for you to log in and access our [facility], including [employer]  No more remembering multiple user names and passwords! We are going to guide you through, step by step, to help you set up your [employer] account which will provide access to your [employer] account  How Do I Sign Up? 1  In your Internet browser, go to [email or web address]       2  Click on the [facility] patient account and then click Dont have an                 Account? Create one now      3  Enter your demographic information and chose a user name (email address) and password  Think of one that is secure and easy to remember   Enter a Referral code if you have one (this is not your [employer] code ) Accept the Terms and Conditions and the Privacy Policy  4  Select your security questions that you will use to reset your password should you forget it  Click Submit  5  Enter your [employer] Activation Code exactly as it appears below  You will not need to use this code after you have completed the sign-up process  If you do not sign up before the expiration date, you must request a new code  [employer] Activation Code: [email or web address]  Expires: 12/18/2018  4:03 PM    6  Confirm your email address  An email confirmation was sent to you  Please open that email and click Confirm your Email   You should then be redirected to our [employer] Single sign on page, where you will log on with the user name and password you created! Proceed to the [employer] Icon to view your personal health information  Additional Information  If you have questions, you can e-mail [email or web address] or call [phone] to talk to our customer support staff  Remember, [employer] is NOT to be used for urgent needs  For medical emergencies, dial 911  Heart Healthy Diet   AMBULATORY CARE:   A heart healthy diet  is an eating plan low in total fat, unhealthy fats, and sodium (salt)  A heart healthy diet helps decrease your risk for heart disease and stroke  Limit the amount of fat you eat to 25% to 35% of your total daily calories  Limit sodium to less than 2,300 mg each day  Healthy fats:  Healthy fats can help improve cholesterol levels  The risk for heart disease is decreased when cholesterol levels are normal  Choose healthy fats, such as the following:  · Unsaturated fat  is found in foods such as soybean, canola, olive, corn, and safflower oils  It is also found in soft tub margarine that is made with liquid vegetable oil  · Omega-3 fat  is found in certain fish, such as salmon, tuna, and trout, and in walnuts and flaxseed    Unhealthy fats:  Unhealthy fats can cause unhealthy cholesterol levels in your blood and increase your risk of heart disease  Limit unhealthy fats, such as the following:  · Cholesterol  is found in animal foods, such as eggs and lobster, and in dairy products made from whole milk  Limit cholesterol to less than 300 milligrams (mg) each day  You may need to limit cholesterol to 200 mg each day if you have heart disease  · Saturated fat  is found in meats, such as talavera and hamburger  It is also found in chicken or turkey skin, whole milk, and butter  Limit saturated fat to less than 7% of your total daily calories  Limit saturated fat to less than 6% if you have heart disease or are at increased risk for it  · Trans fat  is found in packaged foods, such as potato chips and cookies  It is also in hard margarine, some fried foods, and shortening  Avoid trans fats as much as possible    Heart healthy foods and drinks to include:  Ask your dietitian or healthcare provider how many servings to have from each of the following food groups:  · Grains:      ¨ Whole-wheat breads, cereals, and pastas, and brown rice    ¨ Low-fat, low-sodium crackers and chips    · Vegetables:      ¨ Broccoli, green beans, green peas, and spinach    ¨ Collards, kale, and lima beans    ¨ Carrots, sweet potatoes, tomatoes, and peppers    ¨ Canned vegetables with no salt added    · Fruits:      ¨ Bananas, peaches, pears, and pineapple    ¨ Grapes, raisins, and dates    ¨ Oranges, tangerines, grapefruit, orange juice, and grapefruit juice    ¨ Apricots, mangoes, melons, and papaya    ¨ Raspberries and strawberries    ¨ Canned fruit with no added sugar    · Low-fat dairy products:      ¨ Nonfat (skim) milk, 1% milk, and low-fat almond, cashew, or soy milks fortified with calcium    ¨ Low-fat cheese, regular or frozen yogurt, and cottage cheese    · Meats and proteins , such as lean cuts of beef and pork (loin, leg, round), skinless chicken and turkey, legumes, soy products, egg whites, and nuts  Foods and drinks to limit or avoid:  Ask your dietitian or healthcare provider about these and other foods that are high in unhealthy fat, sodium, and sugar:  · Snack or packaged foods , such as frozen dinners, cookies, macaroni and cheese, and cereals with more than 300 mg of sodium per serving    · Canned or dry mixes  for cakes, soups, sauces, or gravies    · Vegetables with added sodium , such as instant potatoes, vegetables with added sauces, or regular canned vegetables    · Other foods high in sodium , such as ketchup, barbecue sauce, salad dressing, pickles, olives, soy sauce, and miso    · High-fat dairy foods  such as whole or 2% milk, cream cheese, or sour cream, and cheeses     · High-fat protein foods  such as high-fat cuts of beef (T-bone steaks, ribs), chicken or turkey with skin, and organ meats, such as liver    · Cured or smoked meats , such as hot dogs, talavera, and sausage    · Unhealthy fats and oils , such as butter, stick margarine, shortening, and cooking oils such as coconut or palm oil    · Food and drinks high in sugar , such as soft drinks (soda), sports drinks, sweetened tea, candy, cake, cookies, pies, and doughnuts  Other diet guidelines to follow:   · Eat more foods containing omega-3 fats  Eat fish high in omega-3 fats at least 2 times a week  · Limit alcohol  Too much alcohol can damage your heart and raise your blood pressure  Women should limit alcohol to 1 drink a day  Men should limit alcohol to 2 drinks a day  A drink of alcohol is 12 ounces of beer, 5 ounces of wine, or 1½ ounces of liquor  · Choose low-sodium foods  High-sodium foods can lead to high blood pressure  Add little or no salt to food you prepare  Use herbs and spices in place of salt  · Eat more fiber  to help lower cholesterol levels  Eat at least 5 servings of fruits and vegetables each day  Eat 3 ounces of whole-grain foods each day   Legumes (beans) are also a good source of fiber   Lifestyle guidelines:   · Do not smoke  Nicotine and other chemicals in cigarettes and cigars can cause lung and heart damage  Ask your healthcare provider for information if you currently smoke and need help to quit  E-cigarettes or smokeless tobacco still contain nicotine  Talk to your healthcare provider before you use these products  · Exercise regularly  to help you maintain a healthy weight and improve your blood pressure and cholesterol levels  Ask your healthcare provider about the best exercise plan for you  Do not start an exercise program without asking your healthcare provider  Follow up with your healthcare provider as directed:  Write down your questions so you remember to ask them during your visits  © 2017 [address] [employer] Information is for End User's use only and may not be sold, redistributed or otherwise used for commercial purposes  All illustrations and images included in CareNotes® are the copyrighted property of [employer]  or [employer]  The above information is an  only  It is not intended as medical advice for individual conditions or treatments  Talk to your doctor, nurse or pharmacist before following any medical regimen to see if it is safe and effective for you

## 2018-12-18 NOTE — ASSESSMENT & PLAN NOTE
Patient was seen for asthma f/u today  She is have symptoms 2x weekly, and almost nightly awakenings due to cat allergen exposures  1 exacerbation in the last year requiring steroids  She will continue Pulmicort and albuterol prn  She will consider starting Singulair and call if she desires this  Counseled regarding exacerbation management    Suggested trials of Flonase to see if this also helps for HS symptoms

## 2018-12-18 NOTE — ASSESSMENT & PLAN NOTE
Patient asks for GYN referral  Initial counseling regarding contraception options provided  Patient may go to GYN for care or may return here for PAP

## 2020-03-07 ENCOUNTER — OFFICE VISIT (OUTPATIENT)
Dept: URGENT CARE | Facility: CLINIC | Age: 25
End: 2020-03-07
Payer: COMMERCIAL

## 2020-03-07 VITALS
BODY MASS INDEX: 32.76 KG/M2 | RESPIRATION RATE: 16 BRPM | OXYGEN SATURATION: 98 % | SYSTOLIC BLOOD PRESSURE: 144 MMHG | DIASTOLIC BLOOD PRESSURE: 76 MMHG | TEMPERATURE: 98.3 F | HEIGHT: 62 IN | WEIGHT: 178 LBS | HEART RATE: 90 BPM

## 2020-03-07 DIAGNOSIS — J01.40 ACUTE NON-RECURRENT PANSINUSITIS: Primary | ICD-10-CM

## 2020-03-07 DIAGNOSIS — R05.9 COUGH: ICD-10-CM

## 2020-03-07 DIAGNOSIS — R05.9 COUGH: Primary | ICD-10-CM

## 2020-03-07 PROCEDURE — G0382 LEV 3 HOSP TYPE B ED VISIT: HCPCS | Performed by: NURSE PRACTITIONER

## 2020-03-07 RX ORDER — FLUTICASONE PROPIONATE 50 MCG
1 SPRAY, SUSPENSION (ML) NASAL DAILY
Qty: 16 G | Refills: 0 | Status: SHIPPED | OUTPATIENT
Start: 2020-03-07

## 2020-03-07 RX ORDER — AMOXICILLIN AND CLAVULANATE POTASSIUM 875; 125 MG/1; MG/1
1 TABLET, FILM COATED ORAL EVERY 12 HOURS SCHEDULED
Qty: 14 TABLET | Refills: 0 | Status: SHIPPED | OUTPATIENT
Start: 2020-03-07 | End: 2020-03-14

## 2020-03-07 RX ORDER — ALBUTEROL SULFATE 90 UG/1
2 AEROSOL, METERED RESPIRATORY (INHALATION) EVERY 6 HOURS PRN
Qty: 1 INHALER | Refills: 0 | Status: SHIPPED | OUTPATIENT
Start: 2020-03-07

## 2020-03-07 NOTE — PROGRESS NOTES
Received message from pharmacy that prescribed rescue inhaler too expensive     Sent new rx for ventolin HFA

## 2020-03-07 NOTE — LETTER
March 7, 2020     Patient: Dominga Laguna   YOB: 1995   Date of Visit: 3/7/2020       To Whom It May Concern: It is my medical opinion that Antonino Wall may return to work on 3/9/2020  She is unable to work 3/7/2020 and 3/8/2020 due to illness       If you have any questions or concerns, please don't hesitate to call           Sincerely,        JULIO Peck

## 2020-03-07 NOTE — PROGRESS NOTES
[facility]        NAME: Rody Shook is a 27 y o  female  : 1995    MRN: [id number]  DATE: 2020  TIME: 9:48 AM    Assessment and Plan   1  Acute non-recurrent pansinusitis  - amoxicillin-clavulanate (AUGMENTIN) 875-125 mg per tablet; Take 1 tablet by mouth every 12 (twelve) hours for 7 days  Dispense: 14 tablet; Refill: 0  - fluticasone (FLONASE) 50 mcg/act nasal spray; 1 spray into each nostril daily  Dispense: 16 g; Refill: 0  2  Cough  - Albuterol Sulfate (ProAir RespiClick) 534 (90 Base) MCG/ACT AEPB; Inhale 2 puffs every 6 (six) hours as needed (chest tightness, wheeze, cough)  Dispense: 1 each; Refill: 0      Patient Instructions   Fluids  Rest  Nasal saline rinses  Symptom management for supportive care such as decongestants, tylenol/motrin as needed for fever or discomfort  Use a cool mist humidifier at bedtime  Flonase as directed  Augmentin 875mg twice daily for one week  Finish antibiotics as prescribed  Recommend taking antibiotics with food  Take probiotic while taking antibiotics to minimize gastrointestinal side effects  Rescue inhaler 2 puffs every 4-6 hours as needed for shortness breath, chest tightness, bronchospasm, coughing fits  Warm compresses to facilitate nasal drainage  Follow up with PCP in 3-5 days if symptoms not improving or worsening          Chief Complaint     Chief Complaint   Patient presents with    Cold Like Symptoms     Pt reports that she has ear pain and had cold sx since last week she was OTC and things are worse  SHe reports having a harsh cough and a fever last night  She denies any pain  History of Present Illness       Here for cold symptoms  More than one week  Not improving  Nasal congestion  Cough  Chest tightness at times  Has not used rescue inhaler  Has asthma  Low grade fever  Taking tylenol, mucinex, vitamin D  No recent travel   Works at [address] Constitutional: Positive for fatigue and fever (low grade)  HENT: Positive for congestion, postnasal drip, rhinorrhea, sinus pressure, sinus pain and sore throat  Respiratory: Positive for cough  Negative for shortness of breath  Gastrointestinal: Negative for diarrhea, nausea and vomiting  Musculoskeletal: Negative for myalgias  Skin: Negative for rash  Neurological: Negative for dizziness and headaches  Hematological: Negative for adenopathy           Current Medications       Current Outpatient Medications:     albuterol (VENTOLIN HFA) 90 mcg/act inhaler, Inhale 2 puffs every 4 (four) hours as needed for wheezing, Disp: 1 Inhaler, Rfl: 3    budesonide (PULMICORT FLEXHALER) 90 MCG/ACT inhaler, Inhale 1 puff daily (Patient not taking: Reported on 3/7/2020), Disp: 1 Inhaler, Rfl: 11    fluticasone (FLONASE) 50 mcg/act nasal spray, 1 spray into each nostril daily (Patient not taking: Reported on 3/7/2020), Disp: 16 g, Rfl: 3    loratadine (CLARITIN) 10 mg tablet, Take 1 tablet (10 mg total) by mouth daily (Patient not taking: Reported on 3/7/2020), Disp: 90 tablet, Rfl: 3    Current Allergies     Allergies as of 03/07/2020 - Reviewed 03/07/2020   Allergen Reaction Noted    Cat hair extract Hives and Itching 05/09/2014    Other Itching and Nasal Congestion 05/09/2014    Dog epithelium Hives and Itching 05/09/2014    Tree extract Itching and Nasal Congestion 05/09/2014            The following portions of the patient's history were reviewed and updated as appropriate: allergies, current medications, past family history, past medical history, past social history, past surgical history and problem list      Past Medical History:   Diagnosis Date    Allergic rhinitis     Anisometropia 9/25/2015    Asthma        Past Surgical History:   Procedure Laterality Date    ADENOIDECTOMY      EAR SURGERY      external - cyst    MYRINGOTOMY W/ TUBES      SINUS SURGERY      TONSILLECTOMY      NATASHA TOOTH EXTRACTION         Family History   Problem Relation Age of Onset    Sickle cell trait Mother     Hypertension Father     Other Father         Overweight    Asthma Sister     Hypertension Sister     Sickle cell trait Sister     Sickle cell trait Brother     Cancer Family     Allergic rhinitis Family          Medications have been verified  Objective   /76   Pulse 90   Temp 98 3 °F (36 8 °C)   Resp 16   Ht 5' 2" (1 575 m)   Wt 80 7 kg (178 lb)   SpO2 98%   BMI 32 56 kg/m²        Physical Exam     Physical Exam   Constitutional: She appears well-developed and well-nourished  No distress  HENT:   TMS WNL  Turbinates inflamed  Oropharynx with no erythema or exudate    (+) frontal and maxillary sinus tenderness to palpation    (+) PND     Pulmonary/Chest: Effort normal and breath sounds normal  She has no wheezes  Tight, wheezy cough noted during exam   Lymphadenopathy:     She has no cervical adenopathy  Neurological: She is alert  Skin: Skin is warm and dry  She is not diaphoretic  Psychiatric: She has a normal mood and affect  Vitals reviewed

## 2020-03-08 DIAGNOSIS — R05.9 COUGH: ICD-10-CM

## 2020-03-08 DIAGNOSIS — J45.40 MODERATE PERSISTENT ASTHMA WITHOUT COMPLICATION: ICD-10-CM

## 2020-03-09 RX ORDER — ALBUTEROL SULFATE 90 UG/1
2 AEROSOL, METERED RESPIRATORY (INHALATION) EVERY 6 HOURS PRN
Qty: 1 INHALER | Refills: 0 | Status: SHIPPED | OUTPATIENT
Start: 2020-03-09

## 2020-03-30 ENCOUNTER — HOSPITAL ENCOUNTER (EMERGENCY)
Facility: HOSPITAL | Age: 25
Discharge: HOME | End: 2020-03-30
Attending: EMERGENCY MEDICINE | Admitting: EMERGENCY MEDICINE
Payer: COMMERCIAL

## 2020-03-30 ENCOUNTER — APPOINTMENT (EMERGENCY)
Dept: RADIOLOGY | Facility: HOSPITAL | Age: 25
End: 2020-03-30
Payer: COMMERCIAL

## 2020-03-30 VITALS
OXYGEN SATURATION: 100 % | TEMPERATURE: 98.7 F | SYSTOLIC BLOOD PRESSURE: 141 MMHG | WEIGHT: 177.91 LBS | DIASTOLIC BLOOD PRESSURE: 96 MMHG | HEART RATE: 84 BPM | RESPIRATION RATE: 18 BRPM | BODY MASS INDEX: 32.54 KG/M2

## 2020-03-30 DIAGNOSIS — Z20.822 SUSPECTED COVID-19 VIRUS INFECTION: ICD-10-CM

## 2020-03-30 DIAGNOSIS — J20.9 ACUTE BRONCHITIS, UNSPECIFIED ORGANISM: Primary | ICD-10-CM

## 2020-03-30 DIAGNOSIS — Z20.822 CLOSE EXPOSURE TO COVID-19 VIRUS: ICD-10-CM

## 2020-03-30 PROCEDURE — 71045 X-RAY EXAM CHEST 1 VIEW: CPT

## 2020-03-30 PROCEDURE — 99283 EMERGENCY DEPT VISIT LOW MDM: CPT | Performed by: EMERGENCY MEDICINE

## 2020-03-30 PROCEDURE — 99284 EMERGENCY DEPT VISIT MOD MDM: CPT

## 2020-03-30 PROCEDURE — 87635 SARS-COV-2 COVID-19 AMP PRB: CPT | Performed by: EMERGENCY MEDICINE

## 2020-03-30 NOTE — DISCHARGE INSTRUCTIONS
[address]    Your healthcare provider and/or public health staff have evaluated you and have determined that you do not need to remain in the hospital at this time  At this time you can be isolated at home where you will be monitored by staff from your local or state health department  You should carefully follow the prevention and isolation steps below until a healthcare provider or local or state health department says that you can return to your normal activities  Stay home except to get medical care    People who are mildly ill with COVID-19 are able to isolate at home during their illness  You should restrict activities outside your home, except for getting medical care  Do not go to work, school, or public areas  Avoid using public transportation, ride-sharing, or taxis  Separate yourself from other people and animals in your home    People: As much as possible, you should stay in a specific room and away from other people in your home  Also, you should use a separate bathroom, if available  Animals: You should restrict contact with pets and other animals while you are sick with COVID-19, just like you would around other people  Although there have not been reports of pets or other animals becoming sick with COVID-19, it is still recommended that people sick with COVID-19 limit contact with animals until more information is known about the virus  When possible, have another member of your household care for your animals while you are sick  If you are sick with COVID-19, avoid contact with your pet, including petting, snuggling, being kissed or licked, and sharing food  If you must care for your pet or be around animals while you are sick, wash your hands before and after you interact with pets and wear a facemask  See COVID-19 and Animals for more information      Call ahead before visiting your doctor    If you have a medical appointment, call the healthcare provider and tell them that you have or may have COVID-19  This will help the healthcare providers office take steps to keep other people from getting infected or exposed  Wear a facemask    You should wear a facemask when you are around other people (e g , sharing a room or vehicle) or pets and before you enter a healthcare providers office  If you are not able to wear a facemask (for example, because it causes trouble breathing), then people who live with you should not stay in the same room with you, or they should wear a facemask if they enter your room  Cover your coughs and sneezes    Cover your mouth and nose with a tissue when you cough or sneeze  Throw used tissues in a lined trash can  Immediately wash your hands with soap and water for at least 20 seconds or, if soap and water are not available, clean your hands with an alcohol-based hand  that contains at least 60% alcohol  Clean your hands often    Wash your hands often with soap and water for at least 20 seconds, especially after blowing your nose, coughing, or sneezing; going to the bathroom; and before eating or preparing food  If soap and water are not readily available, use an alcohol-based hand  with at least 60% alcohol, covering all surfaces of your hands and rubbing them together until they feel dry  Soap and water are the best option if hands are visibly dirty  Avoid touching your eyes, nose, and mouth with unwashed hands  Avoid sharing personal household items    You should not share dishes, drinking glasses, cups, eating utensils, towels, or bedding with other people or pets in your home  After using these items, they should be washed thoroughly with soap and water  Clean all high-touch surfaces everyday    High touch surfaces include counters, tabletops, doorknobs, bathroom fixtures, toilets, phones, keyboards, tablets, and bedside tables  Also, clean any surfaces that may have blood, stool, or body fluids on them   Use a household cleaning spray or wipe, according to the label instructions  Labels contain instructions for safe and effective use of the cleaning product including precautions you should take when applying the product, such as wearing gloves and making sure you have good ventilation during use of the product  Monitor your symptoms    Seek prompt medical attention if your illness is worsening (e g , difficulty breathing)  Before seeking care, call your healthcare provider and tell them that you have, or are being evaluated for, COVID-19  Put on a facemask before you enter the facility  These steps will help the healthcare providers office to keep other people in the office or waiting room from getting infected or exposed  Ask your healthcare provider to call the local or state health department  Persons who are placed under active monitoring or facilitated self-monitoring should follow instructions provided by their local health department or occupational health professionals, as appropriate  If you have a medical emergency and need to call 911, notify the dispatch personnel that you have, or are being evaluated for COVID-19  If possible, put on a facemask before emergency medical services arrive  Discontinuing home isolation    Patients with confirmed COVID-19 should remain under home isolation precautions until the risk of secondary transmission to others is thought to be low  The decision to discontinue home isolation precautions should be made on a case-by-case basis, in consultation with healthcare providers and state and local health departments      Source: [employer]

## 2020-03-30 NOTE — ED PROVIDER NOTES
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 Taking?    albuterol (PROVENTIL HFA,VENTOLIN HFA) 90 mcg/act inhaler   No No   Sig: Inhale 2 puffs every 6 (six) hours as needed for wheezing   albuterol (VENTOLIN HFA) 90 mcg/act inhaler   No No   Sig: Inhale 2 puffs every 4 (four) hours as needed for wheezing   albuterol (Ventolin HFA) 90 mcg/act inhaler   No No   Sig: Inhale 2 puffs every 6 (six) hours as needed for wheezing or shortness of breath   budesonide (PULMICORT FLEXHALER) 90 MCG/ACT inhaler   No No   Sig: Inhale 1 puff daily   Patient not taking: Reported on 3/7/2020   fluticasone (FLONASE) 50 mcg/act nasal spray   No No   Si spray into each nostril daily   loratadine (CLARITIN) 10 mg tablet   No No   Sig: Take 1 tablet (10 mg total) by mouth daily   Patient not taking: Reported on 3/7/2020      Facility-Administered Medications: None       Past Medical History:   Diagnosis Date    Allergic rhinitis     Anisometropia 9/25/2015    Asthma        Past Surgical History:   Procedure Laterality Date    ADENOIDECTOMY      EAR SURGERY      external - cyst    MYRINGOTOMY W/ TUBES      SINUS SURGERY      TONSILLECTOMY      WISDOM TOOTH EXTRACTION         Family History   Problem Relation Age of Onset    Sickle cell trait Mother     Hypertension Father     Other Father         Overweight    Asthma Sister     Hypertension Sister     Sickle cell trait Sister     Sickle cell trait Brother     Cancer Family     Allergic rhinitis Family      I have reviewed and agree with the history as documented  E-Cigarette/Vaping     E-Cigarette/Vaping Substances     Social History     Tobacco Use    Smoking status: Never Smoker    Smokeless tobacco: Never Used   Substance Use Topics    Alcohol use: Yes     Alcohol/week: 4 0 standard drinks     Types: 4 Glasses of wine per week     Comment: yearly    Drug use: No       Review of Systems   Constitutional: Positive for fever  Negative for activity change, chills and diaphoresis  HENT: Negative for congestion, sinus pressure and sore throat  Eyes: Negative for pain and visual disturbance  Respiratory: Positive for cough and shortness of breath  Negative for chest tightness, wheezing and stridor  Cardiovascular: Negative for chest pain and palpitations  Gastrointestinal: Negative for abdominal distention, abdominal pain, constipation, diarrhea, nausea and vomiting  Genitourinary: Negative for dysuria and frequency  Musculoskeletal: Negative for neck pain and neck stiffness  Skin: Negative for rash  Neurological: Negative for dizziness, speech difficulty, light-headedness, numbness and headaches  Physical Exam  Physical Exam   Constitutional: She is oriented to person, place, and time  She appears well-developed  No distress  Patient sitting in stretcher resting   Nontoxic in appearance  , patient actually wearing an N95 mask, coughing continuously throughout examination   HENT:   Head: Normocephalic and atraumatic  Eyes: Pupils are equal, round, and reactive to light  Neck: Normal range of motion  Neck supple  No tracheal deviation present  Cardiovascular: Normal rate, regular rhythm, normal heart sounds and intact distal pulses  No murmur heard  Pulmonary/Chest: Effort normal and breath sounds normal  No stridor  No respiratory distress  Abdominal: Soft  She exhibits no distension  There is no tenderness  There is no rebound and no guarding  Musculoskeletal: Normal range of motion  Neurological: She is alert and oriented to person, place, and time  Skin: Skin is warm and dry  She is not diaphoretic  No erythema  No pallor  Psychiatric: She has a normal mood and affect  Vitals reviewed  Vital Signs  ED Triage Vitals [03/30/20 1208]   Temperature Pulse Respirations Blood Pressure SpO2   99 1 °F (37 3 °C) (!) 110 20 141/96 96 %      Temp Source Heart Rate Source Patient Position - Orthostatic VS BP Location FiO2 (%)   Oral Monitor -- Right arm --      Pain Score       --           Vitals:    03/30/20 1208 03/30/20 1232   BP: 141/96    Pulse: (!) 110 84         Visual Acuity      ED Medications  Medications - No data to display    Diagnostic Studies  Results Reviewed     Procedure Component Value Units Date/Time    Coronavirus 2019-nCoV [facility] [[id number]] Collected:  03/30/20 1233    Lab Status:  No result Specimen:  Nasopharyngeal Swab                  XR chest 1 view portable    (Results Pending)        x-ray interpreted by me, no acute infiltrate      Procedures  Procedures         ED Course                                 MDM  Number of Diagnoses or Management Options  Diagnosis management comments: 61-year-old female, coughing intermittently, low-grade fever, tachycardic, nontoxic in appearance, exposure to COVID-19, will test for COVID-19 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  Otherwise accident saturation 96%, breathing comfortably, chest x-ray without large infiltrates  , will discharge on home corn teen until results       Amount and/or Complexity of Data Reviewed  Clinical lab tests: ordered  Tests in the radiology section of CPT®: ordered and reviewed  Decide to obtain previous medical records or to obtain history from someone other than the patient: yes  Review and summarize past medical records: yes  Independent visualization of images, tracings, or specimens: yes          Disposition  Final diagnoses:   Acute bronchitis, unspecified organism   Suspected Covid-19 Virus Infection   Close Exposure to Covid-19 Virus     Time reflects when diagnosis was documented in both MDM as applicable and the Disposition within this note     Time User Action Codes Description Comment    3/30/2020 12:32 PM Cici CAO Add [J20 9] Acute bronchitis, unspecified organism     3/30/2020 12:32 PM Eileen Giang Add [R68 89] Suspected Covid-19 Virus Infection     3/30/2020 12:32 PM Eileen Giang Add [Z20 [id number]] Close Exposure to Covid-19 Virus       ED Disposition     ED Disposition Condition Date/Time Comment    Discharge Stable Mon Mar 30, 2020 12:32 PM Chinedu Isaacs discharge to home/self care  Follow-up Information    None         Patient's Medications   Discharge Prescriptions    No medications on file     No discharge procedures on file      PDMP Review     None          ED Provider  Electronically Signed by           Debi Marks DO  03/30/20 0783

## 2020-03-31 LAB
INPATIENT: NORMAL
SARS-COV-2 RNA SPEC QL NAA+PROBE: DETECTED

## 2020-04-14 RX ORDER — ALBUTEROL SULFATE 90 UG/1
2 AEROSOL, METERED RESPIRATORY (INHALATION) EVERY 4 HOURS PRN
Qty: 1 INHALER | Refills: 0 | OUTPATIENT
Start: 2020-04-14

## 2020-05-11 ENCOUNTER — PATIENT OUTREACH (OUTPATIENT)
Dept: FAMILY MEDICINE CLINIC | Facility: CLINIC | Age: 25
End: 2020-05-11

## 2020-05-11 NOTE — PATIENT INSTRUCTIONS
Fluids  Rest  Nasal saline rinses  Symptom management for supportive care such as decongestants, tylenol/motrin as needed for fever or discomfort  Use a cool mist humidifier at bedtime  Flonase as directed  Augmentin 875mg twice daily for one week  Finish antibiotics as prescribed  Recommend taking antibiotics with food  Take probiotic while taking antibiotics to minimize gastrointestinal side effects  Rescue inhaler 2 puffs every 4-6 hours as needed for shortness breath, chest tightness, bronchospasm, coughing fits  Warm compresses to facilitate nasal drainage     Follow up with PCP in 3-5 days if symptoms not improving or worsening
3

## 2020-05-27 ENCOUNTER — TELEPHONE (OUTPATIENT)
Dept: FAMILY MEDICINE CLINIC | Facility: CLINIC | Age: 25
End: 2020-05-27

## 2020-06-02 ENCOUNTER — TELEPHONE (OUTPATIENT)
Dept: OTHER | Facility: HOSPITAL | Age: 25
End: 2020-06-02

## 2021-06-01 ENCOUNTER — OFFICE VISIT (OUTPATIENT)
Dept: URGENT CARE | Facility: CLINIC | Age: 26
End: 2021-06-01
Payer: COMMERCIAL

## 2021-06-01 VITALS
BODY MASS INDEX: 37.54 KG/M2 | DIASTOLIC BLOOD PRESSURE: 70 MMHG | HEIGHT: 62 IN | TEMPERATURE: 97.8 F | SYSTOLIC BLOOD PRESSURE: 127 MMHG | WEIGHT: 204 LBS | OXYGEN SATURATION: 98 % | HEART RATE: 84 BPM | RESPIRATION RATE: 18 BRPM

## 2021-06-01 DIAGNOSIS — J45.40 MODERATE PERSISTENT ASTHMA WITHOUT COMPLICATION: ICD-10-CM

## 2021-06-01 DIAGNOSIS — J30.9 ALLERGIC RHINITIS, UNSPECIFIED SEASONALITY, UNSPECIFIED TRIGGER: ICD-10-CM

## 2021-06-01 DIAGNOSIS — J45.41 MODERATE PERSISTENT ASTHMA WITH ACUTE EXACERBATION: Primary | ICD-10-CM

## 2021-06-01 PROCEDURE — G0382 LEV 3 HOSP TYPE B ED VISIT: HCPCS | Performed by: NURSE PRACTITIONER

## 2021-06-01 RX ORDER — BUDESONIDE 90 UG/1
2 AEROSOL, POWDER RESPIRATORY (INHALATION) 2 TIMES DAILY
Qty: 1 EACH | Refills: 1 | Status: SHIPPED | OUTPATIENT
Start: 2021-06-01

## 2021-06-01 RX ORDER — PREDNISONE 10 MG/1
TABLET ORAL
Qty: 36 TABLET | Refills: 0 | Status: SHIPPED | OUTPATIENT
Start: 2021-06-01

## 2021-06-01 NOTE — PROGRESS NOTES
[facility]        NAME: Audrey Gastelum is a 23 y o  female  : 1995    MRN: [id number]  DATE: 2021  TIME: 2:34 PM    Assessment and Plan   Moderate persistent asthma with acute exacerbation [J45 41]  1  Moderate persistent asthma with acute exacerbation  predniSONE 10 mg tablet   2  Allergic rhinitis, unspecified seasonality, unspecified trigger       budesonide (Pulmicort Flexhaler) 90 MCG/ACT inhaler     --Suspect flare of asthma secondary to underlying seasonal allergies +/- viral (non-COVID) URI  Address per below  Patient Instructions     --Take inhaled and oral steroids as directed  --Continue rescue inhaler as needed  --Continue OTC Clartin  Consider adding decongestant (Sudafed), OTC saline nasal spray and Flonase nasal spray  --Tylenol/Motrin as needed  --F/u with PCP if no improvement next 48-72 hours with these measures  --Go to ER if worsening cough, breathing, fever/chills, vomiting, other concerns  Chief Complaint     Chief Complaint   Patient presents with    Cough     x 1 week +    Laryngitis    Sore Throat    Chills         History of Present Illness         Here with complaints of asthma exacerbation  Over the past week she notes increased cough cough, chest tightness/wheezing/dyspnea, nasal congestion, rhinorrhea, sneezing  Cough occasionally productive of clear/yellow sputum  Lost voice two days ago  Sore throat  No fever  Achy  No loss of taste  No abdominal pain, N/V/D  Using rescue inhaler every 2-3 hours with temporary relief of symptoms  Ran out of steroid inhaler (Pulmicort) a few weeks ago  Taking Mucinex and Clartin also  History of spring allergies  No known sick contacts  Had negative COVID test 3 days ago at work  Does not wish to be retested at this time  Review of Systems   Review of Systems   Constitutional: Negative for fever  HENT: Positive for rhinorrhea and sore throat  Negative for ear pain      Eyes: Positive for itching  Respiratory: Positive for cough, chest tightness, shortness of breath and wheezing  Cardiovascular: Negative for chest pain  Gastrointestinal: Negative for nausea and vomiting  Neurological: Negative for headaches           Current Medications       Current Outpatient Medications:     albuterol (Ventolin HFA) 90 mcg/act inhaler, Inhale 2 puffs every 6 (six) hours as needed for wheezing or shortness of breath, Disp: 1 Inhaler, Rfl: 0    loratadine (CLARITIN) 10 mg tablet, Take 1 tablet (10 mg total) by mouth daily, Disp: 90 tablet, Rfl: 3    albuterol (PROVENTIL HFA,VENTOLIN HFA) 90 mcg/act inhaler, Inhale 2 puffs every 6 (six) hours as needed for wheezing, Disp: 1 Inhaler, Rfl: 0    albuterol (VENTOLIN HFA) 90 mcg/act inhaler, Inhale 2 puffs every 4 (four) hours as needed for wheezing, Disp: 1 Inhaler, Rfl: 3    budesonide (Pulmicort Flexhaler) 90 MCG/ACT inhaler, Inhale 2 puffs 2 (two) times a day, Disp: 1 each, Rfl: 1    fluticasone (FLONASE) 50 mcg/act nasal spray, 1 spray into each nostril daily, Disp: 16 g, Rfl: 0    predniSONE 10 mg tablet, TAKE 6 TAB PO DAILY X 1 DAY, THEN 5 TAB DAILY X 2 DAYS, THEN 4 TAB DAILY X 2 DAYS, THEN 3 TAB DAILY X 2 DAYS, THEN 2 TAB DAILY X 2 DAYS, THEN 1 TAB DAILY X 2 DAYS, Disp: 36 tablet, Rfl: 0    Current Allergies     Allergies as of 06/01/2021 - Reviewed 06/01/2021   Allergen Reaction Noted    Cat hair extract Hives and Itching 05/09/2014    Other Itching and Nasal Congestion 05/09/2014    Dog epithelium Hives and Itching 05/09/2014    Tree extract Itching and Nasal Congestion 05/09/2014            The following portions of the patient's history were reviewed and updated as appropriate: allergies, current medications, past family history, past medical history, past social history, past surgical history and problem list      Past Medical History:   Diagnosis Date    Allergic rhinitis     Anisometropia 9/25/2015    Asthma        Past Surgical History:   Procedure Laterality Date    ADENOIDECTOMY      EAR SURGERY      external - cyst    MYRINGOTOMY W/ TUBES      SINUS SURGERY      TONSILLECTOMY      WISDOM TOOTH EXTRACTION         Family History   Problem Relation Age of Onset    Sickle cell trait Mother     Hypertension Father     Other Father         Overweight    Asthma Sister     Hypertension Sister     Sickle cell trait Sister     Sickle cell trait Brother     Cancer Family     Allergic rhinitis Family          Medications have been verified  Objective   /70   Pulse 84   Temp 97 8 °F (36 6 °C)   Resp 18   Ht 5' 2" (1 575 m)   Wt 92 5 kg (204 lb)   SpO2 98%   BMI 37 31 kg/m²   No LMP recorded  Physical Exam     Physical Exam  Constitutional:       Appearance: She is well-developed  HENT:      Head: Normocephalic  Right Ear: Tympanic membrane, ear canal and external ear normal       Left Ear: Tympanic membrane, ear canal and external ear normal       Nose: Congestion and rhinorrhea present  Mouth/Throat:      Pharynx: No oropharyngeal exudate or posterior oropharyngeal erythema  Eyes:      Conjunctiva/sclera: Conjunctivae normal       Pupils: Pupils are equal, round, and reactive to light  Neck:      Musculoskeletal: Normal range of motion and neck supple  Cardiovascular:      Rate and Rhythm: Normal rate and regular rhythm  Heart sounds: Normal heart sounds  Pulmonary:      Effort: Pulmonary effort is normal  No respiratory distress  Breath sounds: No stridor  No rhonchi or rales  Comments: Breathing non-labored  RR=18  Occasional cough noted  BS decreased with few scattered wheezes  Chest:      Chest wall: No tenderness  Abdominal:      General: Bowel sounds are normal       Palpations: Abdomen is soft  Tenderness: There is no abdominal tenderness  Lymphadenopathy:      Cervical: No cervical adenopathy  Skin:     General: Skin is warm and dry  Neurological:      Mental Status: She is alert and oriented to person, place, and time  Deep Tendon Reflexes: Reflexes are normal and symmetric

## 2021-11-30 ENCOUNTER — TELEPHONE (OUTPATIENT)
Dept: FAMILY MEDICINE CLINIC | Facility: CLINIC | Age: 26
End: 2021-11-30

## 2022-01-04 NOTE — TELEPHONE ENCOUNTER
01/04/22 10:21 AM     Thank you for your request  Your request has been received, reviewed, and the patient chart updated  The PCP has successfully been removed with a patient attribution note  PCP has been actively following with an external PCP office; PCP updated per chart information  This message will now be completed      Thank you  Cheryl Whaley

## 2022-11-09 ENCOUNTER — HOSPITAL ENCOUNTER (EMERGENCY)
Facility: HOSPITAL | Age: 27
Discharge: HOME | End: 2022-11-09
Attending: EMERGENCY MEDICINE

## 2022-11-09 VITALS
TEMPERATURE: 98.9 F | HEART RATE: 80 BPM | OXYGEN SATURATION: 97 % | RESPIRATION RATE: 16 BRPM | SYSTOLIC BLOOD PRESSURE: 116 MMHG | DIASTOLIC BLOOD PRESSURE: 56 MMHG

## 2022-11-09 DIAGNOSIS — R82.71 ASYMPTOMATIC BACTERIURIA DURING PREGNANCY IN FIRST TRIMESTER: ICD-10-CM

## 2022-11-09 DIAGNOSIS — O21.9 NAUSEA AND VOMITING IN PREGNANCY: Primary | ICD-10-CM

## 2022-11-09 DIAGNOSIS — O99.891 ASYMPTOMATIC BACTERIURIA DURING PREGNANCY IN FIRST TRIMESTER: ICD-10-CM

## 2022-11-09 LAB
ALBUMIN SERPL BCP-MCNC: 4 G/DL (ref 3.5–5)
ALP SERPL-CCNC: 36 U/L (ref 34–104)
ALT SERPL W P-5'-P-CCNC: 19 U/L (ref 7–52)
ANION GAP SERPL CALCULATED.3IONS-SCNC: 8 MMOL/L (ref 4–13)
AST SERPL W P-5'-P-CCNC: 19 U/L (ref 13–39)
BACTERIA UR QL AUTO: ABNORMAL /HPF
BASOPHILS # BLD AUTO: 0.03 THOUSANDS/ÂΜL (ref 0–0.1)
BASOPHILS NFR BLD AUTO: 0 % (ref 0–1)
BILIRUB SERPL-MCNC: 0.42 MG/DL (ref 0.2–1)
BILIRUB UR QL STRIP: NEGATIVE
BUN SERPL-MCNC: 5 MG/DL (ref 5–25)
CALCIUM SERPL-MCNC: 9.5 MG/DL (ref 8.4–10.2)
CHLORIDE SERPL-SCNC: 106 MMOL/L (ref 96–108)
CLARITY UR: CLEAR
CO2 SERPL-SCNC: 23 MMOL/L (ref 21–32)
COLOR UR: YELLOW
CREAT SERPL-MCNC: 0.5 MG/DL (ref 0.6–1.3)
EOSINOPHIL # BLD AUTO: 0.13 THOUSAND/ÂΜL (ref 0–0.61)
EOSINOPHIL NFR BLD AUTO: 1 % (ref 0–6)
ERYTHROCYTE [DISTWIDTH] IN BLOOD BY AUTOMATED COUNT: 13.2 % (ref 11.6–15.1)
GFR SERPL CREATININE-BSD FRML MDRD: 134 ML/MIN/1.73SQ M
GLUCOSE SERPL-MCNC: 80 MG/DL (ref 65–140)
GLUCOSE UR STRIP-MCNC: NEGATIVE MG/DL
HCT VFR BLD AUTO: 38.8 % (ref 34.8–46.1)
HGB BLD-MCNC: 13 G/DL (ref 11.5–15.4)
HGB UR QL STRIP.AUTO: NEGATIVE
IMM GRANULOCYTES # BLD AUTO: 0.07 THOUSAND/UL (ref 0–0.2)
IMM GRANULOCYTES NFR BLD AUTO: 1 % (ref 0–2)
KETONES UR STRIP-MCNC: ABNORMAL MG/DL
LEUKOCYTE ESTERASE UR QL STRIP: ABNORMAL
LYMPHOCYTES # BLD AUTO: 1.55 THOUSANDS/ÂΜL (ref 0.6–4.47)
LYMPHOCYTES NFR BLD AUTO: 13 % (ref 14–44)
MCH RBC QN AUTO: 27.5 PG (ref 26.8–34.3)
MCHC RBC AUTO-ENTMCNC: 33.5 G/DL (ref 31.4–37.4)
MCV RBC AUTO: 82 FL (ref 82–98)
MONOCYTES # BLD AUTO: 0.7 THOUSAND/ÂΜL (ref 0.17–1.22)
MONOCYTES NFR BLD AUTO: 6 % (ref 4–12)
MUCOUS THREADS UR QL AUTO: ABNORMAL
NEUTROPHILS # BLD AUTO: 9.71 THOUSANDS/ÂΜL (ref 1.85–7.62)
NEUTS SEG NFR BLD AUTO: 79 % (ref 43–75)
NITRITE UR QL STRIP: NEGATIVE
NON-SQ EPI CELLS URNS QL MICRO: ABNORMAL /HPF
NRBC BLD AUTO-RTO: 0 /100 WBCS
PH UR STRIP.AUTO: 5.5 [PH]
PLATELET # BLD AUTO: 178 THOUSANDS/UL (ref 149–390)
PMV BLD AUTO: 11.1 FL (ref 8.9–12.7)
POTASSIUM SERPL-SCNC: 3.8 MMOL/L (ref 3.5–5.3)
PROT SERPL-MCNC: 7.3 G/DL (ref 6.4–8.4)
PROT UR STRIP-MCNC: ABNORMAL MG/DL
RBC # BLD AUTO: 4.72 MILLION/UL (ref 3.81–5.12)
RBC #/AREA URNS AUTO: ABNORMAL /HPF
SODIUM SERPL-SCNC: 137 MMOL/L (ref 135–147)
SP GR UR STRIP.AUTO: 1.02 (ref 1–1.03)
UROBILINOGEN UR STRIP-ACNC: <2 MG/DL
WBC # BLD AUTO: 12.19 THOUSAND/UL (ref 4.31–10.16)
WBC #/AREA URNS AUTO: ABNORMAL /HPF

## 2022-11-09 RX ORDER — DIPHENHYDRAMINE HYDROCHLORIDE 25 MG/1
12.5 CAPSULE ORAL DAILY
Qty: 15 TABLET | Refills: 0 | Status: SHIPPED | OUTPATIENT
Start: 2022-11-09

## 2022-11-09 RX ORDER — CEPHALEXIN 500 MG/1
500 CAPSULE ORAL EVERY 8 HOURS SCHEDULED
Qty: 14 CAPSULE | Refills: 0 | Status: SHIPPED | OUTPATIENT
Start: 2022-11-09 | End: 2022-11-14

## 2022-11-09 RX ORDER — SUCRALFATE 1 G/1
1 TABLET ORAL ONCE
Status: COMPLETED | OUTPATIENT
Start: 2022-11-09 | End: 2022-11-09

## 2022-11-09 RX ORDER — CEPHALEXIN 250 MG/1
500 CAPSULE ORAL ONCE
Status: COMPLETED | OUTPATIENT
Start: 2022-11-09 | End: 2022-11-09

## 2022-11-09 RX ORDER — DEXTROSE AND SODIUM CHLORIDE 5; .45 G/100ML; G/100ML
250 INJECTION, SOLUTION INTRAVENOUS CONTINUOUS
Status: DISCONTINUED | OUTPATIENT
Start: 2022-11-09 | End: 2022-11-09 | Stop reason: HOSPADM

## 2022-11-09 RX ORDER — METOCLOPRAMIDE HYDROCHLORIDE 5 MG/ML
10 INJECTION INTRAMUSCULAR; INTRAVENOUS ONCE
Status: COMPLETED | OUTPATIENT
Start: 2022-11-09 | End: 2022-11-09

## 2022-11-09 RX ORDER — ACETAMINOPHEN 325 MG/1
650 TABLET ORAL ONCE
Status: COMPLETED | OUTPATIENT
Start: 2022-11-09 | End: 2022-11-09

## 2022-11-09 RX ADMIN — DEXTROSE AND SODIUM CHLORIDE 250 ML/HR: 5; .45 INJECTION, SOLUTION INTRAVENOUS at 04:17

## 2022-11-09 RX ADMIN — METOCLOPRAMIDE 10 MG: 5 INJECTION, SOLUTION INTRAMUSCULAR; INTRAVENOUS at 04:00

## 2022-11-09 RX ADMIN — SUCRALFATE 1 G: 1 TABLET ORAL at 04:11

## 2022-11-09 RX ADMIN — CEPHALEXIN 500 MG: 250 CAPSULE ORAL at 07:02

## 2022-11-09 RX ADMIN — DOXYLAMINE SUCCINATE 12.5 MG: 25 TABLET ORAL at 04:19

## 2022-11-09 RX ADMIN — ACETAMINOPHEN 650 MG: 325 TABLET ORAL at 07:02

## 2022-11-09 RX ADMIN — PYRIDOXINE HYDROCHLORIDE 50 MG: 100 INJECTION, SOLUTION INTRAMUSCULAR; INTRAVENOUS at 04:16

## 2022-11-09 NOTE — ED ATTENDING ATTESTATION
11/9/2022  IElmer MD, saw and evaluated the patient  I have discussed the patient with the resident/non-physician practitioner and agree with the resident's/non-physician practitioner's findings, Plan of Care, and MDM as documented in the resident's/non-physician practitioner's note, except where noted  All available labs and Radiology studies were reviewed  I was present for key portions of any procedure(s) performed by the resident/non-physician practitioner and I was immediately available to provide assistance  At this point I agree with the current assessment done in the Emergency Department  I have conducted an independent evaluation of this patient a history and physical is as follows:    ED Course         Critical Care Time  Procedures    Patient with n/v and a headache  Notes morning sickness previously  + vomiting for the past day  No f/c/s  Some abdominal cramping  Some mild swelling in the legs  No vaginal bleeding  MDM pleasant 24 yof, will check labs, check urine  REVIEWED PRIOR NOTES, SEE BELOW FOR RECENT RELEVANT NOTES  ===================================================================  Progress Notes  - documented in this encounter    Coral Strickland PA-C - 11/04/2022 11:10 AM EDT  Formatting of this note might be different from the original   Pt reports she is feeling well  Denies VB, pelvic pain / cramping     Supervision of normal pregnancy  Peggy Murphy presents today for her 14 week MCP visit  She is currently 14w0d  She has obtained her prenatal labs and the results have been reviewed with her  The patient has chosen to undergo average risk cell free DNA with NT and the available results have been reviewed with her as well as the appropriate future follow-up arranged if necessary  MsAFP: declined    Available results were reviewed with her and appropriate follow-up has been arranged    Level II u/s has been ordered and has been scheduled  The patient's MFM consult has been scheduled and/or completed Yes    Flu vaccine No - declines  Hepatitis B vaccine Yes    We reviewed the common symptoms of this stage of pregnancy as well as warning signs/symptoms, and when to contact the office  I have reviewed and updated the patient's problem list   All questions were answered to her satisfaction      Nonimmune to hepatitis B virus  [] #1: 11/04/22       ===================================================================

## 2022-11-09 NOTE — ED PROVIDER NOTES
History  Chief Complaint   Patient presents with   • Vomiting During Pregnancy     Pt to ED with c/o nausea and vomiting x2 days, pt complains of migraines with vomiting     22-year-old female approximately 14 weeks pregnant  who presents to the emergency department with nausea and vomiting  Patient states symptoms started 1 5 days ago  Patient states she had morning sickness in the beginning of pregnancy but nothing like this  Patient reports associated headache that improved with Tylenol, lightheadedness, mild abdominal cramping with vomiting  Patient also states she has some mild lower extremity swelling today  Patient denies any fever, chills, chest pain, shortness of breath, pelvic pain, vaginal bleeding, vaginal discharge, leakage of fluid, hematemesis, diarrhea, constipation, blood in stool or any other symptoms  History provided by:  Patient  Vomiting  Severity:  Moderate  Timing:  Constant  Number of daily episodes:  7-8  Quality:  Stomach contents  Able to tolerate:  Liquids  Progression:  Unchanged  Chronicity:  New  Recent urination:  Normal  Relieved by:  Nothing  Exacerbated by: foods  Ineffective treatments:  Liquids  Associated symptoms: abdominal pain (mild cramping only with vomiting) and headaches (with vomitig)    Associated symptoms: no arthralgias, no chills, no cough, no diarrhea, no fever, no myalgias and no sore throat        Prior to Admission Medications   Prescriptions Last Dose Informant Patient Reported? Taking?    albuterol (PROVENTIL HFA,VENTOLIN HFA) 90 mcg/act inhaler   No No   Sig: Inhale 2 puffs every 6 (six) hours as needed for wheezing   albuterol (VENTOLIN HFA) 90 mcg/act inhaler   No No   Sig: Inhale 2 puffs every 4 (four) hours as needed for wheezing   albuterol (Ventolin HFA) 90 mcg/act inhaler   No No   Sig: Inhale 2 puffs every 6 (six) hours as needed for wheezing or shortness of breath   budesonide (Pulmicort Flexhaler) 90 MCG/ACT inhaler   No No   Sig: Inhale 2 puffs 2 (two) times a day   fluticasone (FLONASE) 50 mcg/act nasal spray   No No   Si spray into each nostril daily   loratadine (CLARITIN) 10 mg tablet   No No   Sig: Take 1 tablet (10 mg total) by mouth daily   predniSONE 10 mg tablet   No No   Sig: TAKE 6 TAB PO DAILY X 1 DAY, THEN 5 TAB DAILY X 2 DAYS, THEN 4 TAB DAILY X 2 DAYS, THEN 3 TAB DAILY X 2 DAYS, THEN 2 TAB DAILY X 2 DAYS, THEN 1 TAB DAILY X 2 DAYS      Facility-Administered Medications: None       Past Medical History:   Diagnosis Date   • Allergic rhinitis    • Anisometropia 2015   • Asthma        Past Surgical History:   Procedure Laterality Date   • ADENOIDECTOMY     • EAR SURGERY      external - cyst   • MYRINGOTOMY W/ TUBES     • SINUS SURGERY     • TONSILLECTOMY     • WISDOM TOOTH EXTRACTION         Family History   Problem Relation Age of Onset   • Sickle cell trait Mother    • Hypertension Father    • Other Father         Overweight   • Asthma Sister    • Hypertension Sister    • Sickle cell trait Sister    • Sickle cell trait Brother    • Cancer Family    • Allergic rhinitis Family      I have reviewed and agree with the history as documented  E-Cigarette/Vaping     E-Cigarette/Vaping Substances     Social History     Tobacco Use   • Smoking status: Never Smoker   • Smokeless tobacco: Never Used   Substance Use Topics   • Alcohol use: Yes     Alcohol/week: 4 0 standard drinks     Types: 4 Glasses of wine per week     Comment: yearly   • Drug use: No        Review of Systems   Constitutional: Negative for chills and fever  HENT: Negative for ear pain and sore throat  Eyes: Negative for pain and visual disturbance  Respiratory: Negative for cough and shortness of breath  Cardiovascular: Positive for leg swelling (mild)  Negative for chest pain and palpitations  Gastrointestinal: Positive for abdominal pain (mild cramping only with vomiting) and vomiting  Negative for diarrhea     Genitourinary: Negative for dysuria and hematuria  Musculoskeletal: Negative for arthralgias, back pain and myalgias  Skin: Negative for color change and rash  Neurological: Positive for light-headedness (with vomiting) and headaches (with vomitig)  Negative for syncope  All other systems reviewed and are negative  Physical Exam  ED Triage Vitals   Temperature Pulse Respirations Blood Pressure SpO2   11/09/22 0248 11/09/22 0248 11/09/22 0248 11/09/22 0248 11/09/22 0248   98 9 °F (37 2 °C) 89 18 140/67 100 %      Temp src Heart Rate Source Patient Position - Orthostatic VS BP Location FiO2 (%)   -- 11/09/22 0430 11/09/22 0345 11/09/22 0345 --    Monitor Sitting Right arm       Pain Score       --                    Orthostatic Vital Signs  Vitals:    11/09/22 0345 11/09/22 0430 11/09/22 0500 11/09/22 0530   BP: 134/76 115/63 116/60 116/56   Pulse: 73 71 81 80   Patient Position - Orthostatic VS: Sitting Sitting         Physical Exam  Vitals and nursing note reviewed  Constitutional:       General: She is not in acute distress  Appearance: She is well-developed  HENT:      Head: Normocephalic and atraumatic  Right Ear: External ear normal  No drainage  Left Ear: External ear normal  No drainage  Nose: Nose normal  No nasal deformity or rhinorrhea  Mouth/Throat:      Mouth: Mucous membranes are dry  Pharynx: Oropharynx is clear  Uvula midline  No pharyngeal swelling or posterior oropharyngeal erythema  Eyes:      Conjunctiva/sclera: Conjunctivae normal    Cardiovascular:      Rate and Rhythm: Normal rate and regular rhythm  Heart sounds: No murmur heard  Pulmonary:      Effort: Pulmonary effort is normal  No respiratory distress  Breath sounds: Normal breath sounds  Abdominal:      General: Bowel sounds are normal       Palpations: Abdomen is soft  Tenderness: There is no abdominal tenderness  Musculoskeletal:      Cervical back: Neck supple     Skin:     General: Skin is warm and dry    Neurological:      General: No focal deficit present  Mental Status: She is alert and oriented to person, place, and time  ED Medications  Medications   doxylamine (UNISOM) tablet 12 5 mg (12 5 mg Oral Given 11/9/22 0419)   dextrose 5 % and sodium chloride 0 45 % infusion (250 mL/hr Intravenous New Bag 11/9/22 0417)   cephalexin (KEFLEX) capsule 500 mg (has no administration in time range)   acetaminophen (TYLENOL) tablet 650 mg (has no administration in time range)   pyridoxine (VITAMIN B6) injection 50 mg (50 mg Intravenous Given 11/9/22 0416)   sucralfate (CARAFATE) tablet 1 g (1 g Oral Given 11/9/22 0411)   metoclopramide (REGLAN) injection 10 mg (10 mg Intravenous Given 11/9/22 0400)       Diagnostic Studies  Results Reviewed     Procedure Component Value Units Date/Time    Urine Microscopic [[id number]]  (Abnormal) Collected: 11/09/22 0406    Lab Status: Final result Specimen: Urine, Clean Catch Updated: 11/09/22 0527     RBC, UA None Seen /hpf      WBC, UA 4-10 /hpf      Epithelial Cells Occasional /hpf      Bacteria, UA Occasional /hpf      MUCUS THREADS Moderate     URINE COMMENT --    UA w Reflex to Microscopic w Reflex to Culture [[id number]]  (Abnormal) Collected: 11/09/22 0406    Lab Status: Final result Specimen: Urine, Clean Catch Updated: 11/09/22 0453     Color, UA Yellow     Clarity, UA Clear     Specific Gravity, UA 1 023     pH, UA 5 5     Leukocytes, UA Small     Nitrite, UA Negative     Protein, UA 30 (1+) mg/dl      Glucose, UA Negative mg/dl      Ketones, UA 20 (1+) mg/dl      Urobilinogen, UA <2 0 mg/dl      Bilirubin, UA Negative     Occult Blood, UA Negative     URINE COMMENT --    Urine culture [[id number]] Collected: 11/09/22 0406    Lab Status:  In process Specimen: Urine, Clean Catch Updated: 11/09/22 0452    Comprehensive metabolic panel [[id number]]  (Abnormal) Collected: 11/09/22 0356    Lab Status: Final result Specimen: Blood from Arm, Right Updated: 11/09/22 8593 Sodium 137 mmol/L      Potassium 3 8 mmol/L      Chloride 106 mmol/L      CO2 23 mmol/L      ANION GAP 8 mmol/L      BUN 5 mg/dL      Creatinine 0 50 mg/dL      Glucose 80 mg/dL      Calcium 9 5 mg/dL      AST 19 U/L      ALT 19 U/L      Alkaline Phosphatase 36 U/L      Total Protein 7 3 g/dL      Albumin 4 0 g/dL      Total Bilirubin 0 42 mg/dL      eGFR 134 ml/min/1 73sq m     Narrative:      National Kidney Disease Foundation guidelines for Chronic Kidney Disease (CKD):   •  Stage 1 with normal or high GFR (GFR > 90 mL/min/1 73 square meters)  •  Stage 2 Mild CKD (GFR = 60-89 mL/min/1 73 square meters)  •  Stage 3A Moderate CKD (GFR = 45-59 mL/min/1 73 square meters)  •  Stage 3B Moderate CKD (GFR = 30-44 mL/min/1 73 square meters)  •  Stage 4 Severe CKD (GFR = 15-29 mL/min/1 73 square meters)  •  Stage 5 End Stage CKD (GFR <15 mL/min/1 73 square meters)  Note: GFR calculation is accurate only with a steady state creatinine    CBC and differential [[id number]]  (Abnormal) Collected: 11/09/22 0354    Lab Status: Final result Specimen: Blood from Arm, Right Updated: 11/09/22 0405     WBC 12 19 Thousand/uL      RBC 4 72 Million/uL      Hemoglobin 13 0 g/dL      Hematocrit 38 8 %      MCV 82 fL      MCH 27 5 pg      MCHC 33 5 g/dL      RDW 13 2 %      MPV 11 1 fL      Platelets 197 Thousands/uL      nRBC 0 /100 WBCs      Neutrophils Relative 79 %      Immat GRANS % 1 %      Lymphocytes Relative 13 %      Monocytes Relative 6 %      Eosinophils Relative 1 %      Basophils Relative 0 %      Neutrophils Absolute 9 71 Thousands/µL      Immature Grans Absolute 0 07 Thousand/uL      Lymphocytes Absolute 1 55 Thousands/µL      Monocytes Absolute 0 70 Thousand/µL      Eosinophils Absolute 0 13 Thousand/µL      Basophils Absolute 0 03 Thousands/µL                  No orders to display         Procedures  Procedures      ED Course  ED Course as of 11/09/22 0650   Wed Nov 09, 2022   9103 Blood Pressure: 140/67   0329 Temperature: 98 9 °F (37 2 °C)   0329 Pulse: 89   0330 Respirations: 18   0330 SpO2: 100 %   0330 23 yo F     0406 CBC and differential(!)  Elevated white blood cell count, could be due to pregnancy or reactionary due to vomiting    0406 Blood Pressure: 134/76   0430 Comprehensive metabolic panel(!)  Unremarkable   0435 UA w Reflex to Microscopic w Reflex to Culture(!)  Small leukocytes, negative nitrites  30+ protein and 20+ ketones in the urine  Most likely due to dehydration  2845 Urine Microscopic(!)  4-10 wbcs with moderate mucus strands  Will treat with Keflex  3015 Fetal heart tones 153   0540 On re-evaluation patient is feeling significantly improved  Will have patient finish IV fluids before discharge home  6013 Results discussed with patient  Patient expresses understanding  Will give patient Keflex here  Patient also reports mild headache will also give Tylenol  Patient still feels improved  0630 Plan:  Discharge patient home with Keflex, vitamin B6, and Unisom  Will also have patient call her OB today to schedule follow-up appointment  Also instructed to call her PCP as needed  Instructed return to the emergency department for new or worsening symptoms  Patient expresses understanding is agreeable plan  Patient was given the opportunity ask questions emergency department  All questions concerns were addressed emergency department                                         MDM  Number of Diagnoses or Management Options  Asymptomatic bacteriuria during pregnancy in first trimester: new and requires workup  Nausea and vomiting in pregnancy: new and requires workup  Diagnosis management comments: See ED course for medical decision making       Amount and/or Complexity of Data Reviewed  Clinical lab tests: ordered and reviewed  Independent visualization of images, tracings, or specimens: yes        Disposition  Final diagnoses:   Nausea and vomiting in pregnancy   Asymptomatic bacteriuria during pregnancy in first trimester     Time reflects when diagnosis was documented in both MDM as applicable and the Disposition within this note     Time User Action Codes Description Comment    11/9/2022  6:29 AM Richardson ROSADO Add [O21 9] Nausea and vomiting in pregnancy     11/9/2022  6:31 AM Tamara Greene Add [K3 314,  R82 71] Asymptomatic bacteriuria during pregnancy in first trimester       ED Disposition     ED Disposition   Discharge    Condition   Stable    Date/Time   Wed Nov 9, 2022  6:30 AM    Comment   Saige Perez discharge to home/self care                 Follow-up Information     Follow up With Specialties Details Why Contact Info Additional Information    Your OBGYN  Call today To schedule an appointment for evaluation      [place] [address] Emergency Department Emergency Medicine Go to  As needed, If symptoms worsen [address] [facility] [id number] [place] [address] Emergency Department, [address], [place], [place], [address] Medicine Schedule an appointment as soon as possible for a visit today As needed, If symptoms worsen [address] [place] [address]  [phone]             Patient's Medications   Discharge Prescriptions    CEPHALEXIN (KEFLEX) 500 MG CAPSULE    Take 1 capsule (500 mg total) by mouth every 8 (eight) hours for 5 days       Start Date: 11/9/2022 End Date: 11/14/2022       Order Dose: 500 mg       Quantity: 14 capsule    Refills: 0    DOXYLAMINE (UNISOM) 25 MG TABLET    Take 0 5 tablets (12 5 mg total) by mouth daily at bedtime as needed for nausea       Start Date: 11/9/2022 End Date: --       Order Dose: 12 5 mg       Quantity: 15 tablet    Refills: 0    PYRIDOXINE HCL (VITAMIN B-6) 25 MG TABLET    Take 0 5 tablets (12 5 mg total) by mouth daily       Start Date: 11/9/2022 End Date: --       Order Dose: 12 5 mg       Quantity: 15 tablet    Refills: 0     No discharge procedures on file  PDMP Review     None           ED Provider  Attending physically available and evaluated [address] Childs I managed the patient along with the ED Attending      Electronically Signed by         Mariah Chou DO  11/09/22 9099

## 2022-11-09 NOTE — DISCHARGE INSTRUCTIONS
You were seen and evaluated emergency department for nausea vomiting  Your given sucralfate, vitamin B6, Reglan, Unisom, dextrose 5% in sodium chloride, Tylenol, Keflex  You were found to have asymptomatic bacteriuria (bacteria in urine)  Please call your OBgyn to schedule appointment for follow-up after being evaluated in the emergency department  Follow-up with primary care doctor as needed  Return to the emergency department for any new or worsening symptoms, including but not limited to:  Vaginal bleeding, lower abdominal pain, leakage of fluid, worsening headache, worsening nausea vomiting, or any new or worsening symptoms

## 2022-11-10 LAB — BACTERIA UR CULT: NORMAL

## 2022-11-30 ENCOUNTER — HOSPITAL ENCOUNTER (OUTPATIENT)
Facility: HOSPITAL | Age: 27
Discharge: HOME | End: 2022-11-30
Attending: STUDENT IN AN ORGANIZED HEALTH CARE EDUCATION/TRAINING PROGRAM | Admitting: STUDENT IN AN ORGANIZED HEALTH CARE EDUCATION/TRAINING PROGRAM

## 2022-11-30 VITALS
DIASTOLIC BLOOD PRESSURE: 68 MMHG | HEART RATE: 80 BPM | SYSTOLIC BLOOD PRESSURE: 123 MMHG | OXYGEN SATURATION: 98 % | HEIGHT: 62 IN | TEMPERATURE: 97.6 F | WEIGHT: 205 LBS | BODY MASS INDEX: 37.73 KG/M2 | RESPIRATION RATE: 18 BRPM

## 2022-11-30 DIAGNOSIS — O21.9 VOMITING DURING PREGNANCY: Primary | ICD-10-CM

## 2022-11-30 LAB
ALBUMIN SERPL BCP-MCNC: 4 G/DL (ref 3.5–5)
ALP SERPL-CCNC: 46 U/L (ref 34–104)
ALT SERPL W P-5'-P-CCNC: 40 U/L (ref 7–52)
ANION GAP SERPL CALCULATED.3IONS-SCNC: 12 MMOL/L (ref 4–13)
AST SERPL W P-5'-P-CCNC: 29 U/L (ref 13–39)
BACTERIA UR QL AUTO: ABNORMAL /HPF
BILIRUB SERPL-MCNC: 0.59 MG/DL (ref 0.2–1)
BILIRUB UR QL STRIP: NEGATIVE
BUN SERPL-MCNC: 6 MG/DL (ref 5–25)
CALCIUM SERPL-MCNC: 9.8 MG/DL (ref 8.4–10.2)
CHLORIDE SERPL-SCNC: 104 MMOL/L (ref 96–108)
CLARITY UR: CLEAR
CO2 SERPL-SCNC: 21 MMOL/L (ref 21–32)
COLOR UR: YELLOW
CREAT SERPL-MCNC: 0.56 MG/DL (ref 0.6–1.3)
ERYTHROCYTE [DISTWIDTH] IN BLOOD BY AUTOMATED COUNT: 13.2 % (ref 11.6–15.1)
FLUAV RNA RESP QL NAA+PROBE: NEGATIVE
FLUBV RNA RESP QL NAA+PROBE: NEGATIVE
GFR SERPL CREATININE-BSD FRML MDRD: 130 ML/MIN/1.73SQ M
GLUCOSE SERPL-MCNC: 67 MG/DL (ref 65–140)
GLUCOSE UR STRIP-MCNC: NEGATIVE MG/DL
HCT VFR BLD AUTO: 38.3 % (ref 34.8–46.1)
HGB BLD-MCNC: 13 G/DL (ref 11.5–15.4)
HGB UR QL STRIP.AUTO: NEGATIVE
KETONES UR STRIP-MCNC: ABNORMAL MG/DL
LEUKOCYTE ESTERASE UR QL STRIP: NEGATIVE
MCH RBC QN AUTO: 28.4 PG (ref 26.8–34.3)
MCHC RBC AUTO-ENTMCNC: 33.9 G/DL (ref 31.4–37.4)
MCV RBC AUTO: 84 FL (ref 82–98)
MUCOUS THREADS UR QL AUTO: ABNORMAL
NITRITE UR QL STRIP: NEGATIVE
NON-SQ EPI CELLS URNS QL MICRO: ABNORMAL /HPF
PH UR STRIP.AUTO: 5.5 [PH]
PLATELET # BLD AUTO: 203 THOUSANDS/UL (ref 149–390)
PMV BLD AUTO: 12 FL (ref 8.9–12.7)
POTASSIUM SERPL-SCNC: 4 MMOL/L (ref 3.5–5.3)
PROT SERPL-MCNC: 7.6 G/DL (ref 6.4–8.4)
PROT UR STRIP-MCNC: ABNORMAL MG/DL
RBC # BLD AUTO: 4.57 MILLION/UL (ref 3.81–5.12)
RBC #/AREA URNS AUTO: ABNORMAL /HPF
RSV RNA RESP QL NAA+PROBE: NEGATIVE
SARS-COV-2 RNA RESP QL NAA+PROBE: NEGATIVE
SODIUM SERPL-SCNC: 137 MMOL/L (ref 135–147)
SP GR UR STRIP.AUTO: 1.02 (ref 1–1.03)
UROBILINOGEN UR STRIP-ACNC: <2 MG/DL
WBC # BLD AUTO: 13.39 THOUSAND/UL (ref 4.31–10.16)
WBC #/AREA URNS AUTO: ABNORMAL /HPF

## 2022-11-30 RX ORDER — ONDANSETRON 2 MG/ML
4 INJECTION INTRAMUSCULAR; INTRAVENOUS ONCE
Status: COMPLETED | OUTPATIENT
Start: 2022-11-30 | End: 2022-11-30

## 2022-11-30 RX ORDER — ONDANSETRON 4 MG/1
4 TABLET, ORALLY DISINTEGRATING ORAL ONCE
Status: DISCONTINUED | OUTPATIENT
Start: 2022-11-30 | End: 2022-11-30

## 2022-11-30 RX ORDER — ONDANSETRON 2 MG/ML
4 INJECTION INTRAMUSCULAR; INTRAVENOUS ONCE
Status: DISCONTINUED | OUTPATIENT
Start: 2022-11-30 | End: 2022-11-30

## 2022-11-30 RX ORDER — ONDANSETRON 4 MG/1
4 TABLET, ORALLY DISINTEGRATING ORAL EVERY 6 HOURS PRN
Qty: 10 TABLET | Refills: 0 | Status: SHIPPED | OUTPATIENT
Start: 2022-11-30

## 2022-11-30 RX ORDER — SODIUM CHLORIDE, SODIUM LACTATE, POTASSIUM CHLORIDE, CALCIUM CHLORIDE 600; 310; 30; 20 MG/100ML; MG/100ML; MG/100ML; MG/100ML
1000 INJECTION, SOLUTION INTRAVENOUS CONTINUOUS
Status: DISCONTINUED | OUTPATIENT
Start: 2022-11-30 | End: 2022-11-30

## 2022-11-30 RX ADMIN — ONDANSETRON 4 MG: 2 INJECTION INTRAMUSCULAR; INTRAVENOUS at 04:31

## 2022-11-30 RX ADMIN — SODIUM CHLORIDE, SODIUM LACTATE, POTASSIUM CHLORIDE, AND CALCIUM CHLORIDE 1000 ML: .6; .31; .03; .02 INJECTION, SOLUTION INTRAVENOUS at 04:28

## 2022-11-30 NOTE — PROGRESS NOTES
L&D Triage Note - OB/GYN  Edward Toney 24 y o  female MRN: [id number]  Unit/Bed#: LD TRIAGE 1 Encounter: [id number]      ASSESSMENT:    Edward Toney is a 24 y o   at 17w5d presenting to L&D with nausea, vomiting, and abdominal cramping  Labs indicated dehydration (4+ ketones, 1+ protein, occasional mucus, 4-10 WBC on UA) without evidence of infection (afebrile, covid/flu/rsv neg)  Patient improved with antiemetic and IV fluid resuscitation  ODT zofran sent to patient's pharmacy  Patient comfortable with discharge to home with outpatient primary OB/GYN follow-up  Patient tolerating PO fluids at time of discharge  PLAN:    1) Dehydration 2/2 Nausea and vomiting of pregnancy   - Labs: 4+ ketones, 1+ protein, occasional mucus, 4-10 WBC on UA; COVID/Flu/RSV negative   - Patient improved with IV zofran, 1L bolus of LR  Able to tolerate PO liquids   - Continue unisom, B6, tums PRN  - PRN zofran 4mg sent to pharmacy  - Patient to follow up with outpatient primary OB/GYN     2) Abdominal pain, rule out  labor   - Cervical length appropriate   - No pooling on speculum exam   - Cervix closed on speculum exam   - Patient not experiencing contractions   - Patient not in  labor   - Cramping abdominal pain likely 2/2 dehydration     3) 17w5d Intrauterine Pregnancy  - Continue routine prenatal care  - Discharge from [facility] triage with  labor precautions   - Reviewed rupture of membranes, false vs true labor, decreased fetal movement, and vaginal bleeding  - Pt to call provider with any concerns and follow up at her next scheduled prenatal appointment  - Patient seen with Dr Rob Penaloza and Dr Janae Ching, Case discussed with Dr Ellen Burton:    Edward Toney 24 y o   at 17w5d with an Estimated Date of Delivery: 23 presenting to L&D for nausea, vomiting, and abdominal cramping  She usually follows with [facility] for her prenatal care       Patient reports having "really bad morning sickness" at baseline, where she typically vomits 1-2x/day  For the last two days patient has vomited 12-14x/day  She reports one episode of blood in her vomit, which was specifically a string of bright red blood  Patient endorses some intermittent abdominal cramping along with decreased urine output  She reports not being able to tolerate any PO intake, liquid or solid  She endorses feeling like her throat is raw  She also reports getting a headache when she vomits, which usually resolves once she stops vomiting  Patient denies any recent sick contacts  She also denies any fevers, chils, dysuria, hematuria, cough, congestion, leg pain or swelling, or vision changes  She endorses good fetal  Movement and denies any contractions, gush of fluids, or vaginal bleeding  Her last bowel movement was either Monday evening or Tuesday morning, though she reports it was small  She does endorse some intermittent constipation  Contractions: denies  Leakage of fluid: denies  Vaginal Bleeding: denies  Fetal movement: good    Her current obstetrical history is significant for the following:   - Asthma   - IBS  - BMI 37 49, pregravid BMI 35 51    Her past obstetrical history is significant for no past obstetrical history  OBJECTIVE:  /70   Pulse 81   Temp 97 6 °F (36 4 °C) (Oral)   Resp 18   Ht 5' 2" (1 575 m)   Wt 93 kg (205 lb)   LMP 07/29/2022 (Exact Date)   SpO2 98%   BMI 37 49 kg/m²     Review of Systems   Constitutional: Negative for chills and fever  HENT: Negative for rhinorrhea, sinus pressure, sinus pain and sore throat  Eyes: Negative for visual disturbance  Respiratory: Negative for chest tightness and shortness of breath  Cardiovascular: Negative for chest pain, palpitations and leg swelling  Gastrointestinal: Positive for abdominal pain, constipation, nausea and vomiting  Negative for diarrhea  Endocrine: Negative for polydipsia and polyuria     Genitourinary: Positive for decreased urine volume  Negative for difficulty urinating, dysuria, hematuria, vaginal bleeding and vaginal discharge  Musculoskeletal: Negative for arthralgias and back pain  Skin: Negative for color change and rash  Allergic/Immunologic: Positive for environmental allergies  Neurological: Positive for headaches  Negative for dizziness, light-headedness and numbness  Hematological: Negative for adenopathy  Psychiatric/Behavioral: The patient is not nervous/anxious  All other systems reviewed and are negative  Physical Exam  Vitals reviewed  Exam conducted with a chaperone present  Constitutional:       General: She is not in acute distress  Appearance: She is not toxic-appearing or diaphoretic  HENT:      Head: Normocephalic and atraumatic  Nose: No congestion or rhinorrhea  Mouth/Throat:      Mouth: Mucous membranes are dry  Eyes:      General: No scleral icterus  Right eye: No discharge  Left eye: No discharge  Conjunctiva/sclera: Conjunctivae normal    Cardiovascular:      Rate and Rhythm: Normal rate and regular rhythm  Heart sounds: Normal heart sounds  No murmur heard  No friction rub  No gallop  Pulmonary:      Effort: Pulmonary effort is normal  No respiratory distress  Breath sounds: Normal breath sounds  No stridor  No wheezing, rhonchi or rales  Chest:      Chest wall: No tenderness  Abdominal:      Palpations: Abdomen is soft  Tenderness: There is abdominal tenderness  There is guarding  There is no rebound  Genitourinary:     General: Normal vulva  Pubic Area: No rash or pubic lice  Dereck stage (genital): 5  Labia:         Right: No rash, tenderness, lesion or injury  Left: No rash, tenderness, lesion or injury  Vagina: No erythema, tenderness, bleeding, lesions or prolapsed vaginal walls  Cervix: No friability, lesion or erythema        Comments: Performed by Dr Juan Pablo Serrano  Cervix is long and closed  Musculoskeletal:         General: No swelling, tenderness, deformity or signs of injury  Right lower leg: No edema  Left lower leg: No edema  Skin:     General: Skin is warm and dry  Capillary Refill: Capillary refill takes 2 to 3 seconds  Coloration: Skin is not jaundiced or pale  Findings: No bruising, erythema, lesion or rash  Neurological:      Mental Status: She is alert  Mental status is at baseline  Motor: No weakness  Psychiatric:         Mood and Affect: Mood normal          Behavior: Behavior normal          Thought Content:  Thought content normal          Judgment: Judgment normal        Cervical Exam performed by Dr Nata Meza: Cervical os is closed    Fetal monitoring:  FHT:  148 bpm     KOH/WTMT:     Infection:   - no clue cells    - no hyphae   - no trichomonads present    Membrane status   - no ferning   - no pooling          TVUS performed by Dr Clemente Lines    - Cervical length    - 3 19 cm    - 3 38 cm    - 3 04 cm   - Presentation: vertex   - Good fetal movement     Labs:     Component      Latest Ref Rng & Units 11/30/2022             Color, UA       Yellow   Clarity, UA       Clear   SL AMB SPECIFIC GRAVITY-URINE      1 003 - 1 030 1 019   pH, UA      4 5, 5 0, 5 5, 6 0, 6 5, 7 0, 7 5, 8 0 5 5   Leukocytes, UA      Negative Negative   Nitrite, UA      Negative Negative   POCT URINE PROTEIN      Negative mg/dl 30 (1+) (A)   Glucose, UA      Negative mg/dl Negative   Ketones, UA      Negative mg/dl >=150 (4+) (A)   Urobilinogen, UA      <2 0 mg/dl mg/dl <2 0   Bilirubin, UA      Negative Negative   Blood, UA      Negative Negative   RBC, UA      None Seen, 1-2 /hpf None Seen   WBC, UA      None Seen, 1-2 /hpf 4-10 (A)   Epithelial Cells      None Seen, Occasional /hpf Occasional   Bacteria, UA      None Seen, Occasional /hpf Occasional   MUCUS THREADS      None Seen Occasional (A)   URINE COMMENT              Component      Latest Ref Rng & Units 11/30/2022   SARS-COV-2      Negative Negative   INFLU A PCR      Negative Negative   INFLU B PCR      Negative Negative   RSV PCR      Negative Negative       Component      Latest Ref Rng & Units 11/30/2022   WBC      4 31 - 10 16 Thousand/uL 13 39 (H)   Red Blood Cell Count      3 81 - 5 12 Million/uL 4 57   Hemoglobin      11 5 - 15 4 g/dL 13 0   HCT      34 8 - 46 1 % 38 3   MCV      82 - 98 fL 84   MCH      26 8 - 34 3 pg 28 4   MCHC      31 4 - 37 4 g/dL 33 9   RDW      11 6 - 15 1 % 13 2   MPV      8 9 - 12 7 fL 12 0   Platelet Count      946 - 390 Thousands/uL 203       Component      Latest Ref Rng & Units 11/30/2022   Sodium      135 - 147 mmol/L 137   Potassium      3 5 - 5 3 mmol/L 4 0   Chloride      96 - 108 mmol/L 104   CO2      21 - 32 mmol/L 21   Anion Gap      4 - 13 mmol/L 12   BUN      5 - 25 mg/dL 6   Creatinine      0 60 - 1 30 mg/dL 0 56 (L)   Glucose, Random      65 - 140 mg/dL 67   Calcium      8 4 - 10 2 mg/dL 9 8   AST      13 - 39 U/L 29   ALT      7 - 52 U/L 40   Alkaline Phosphatase      34 - 104 U/L 46   Total Protein      6 4 - 8 4 g/dL 7 6   Albumin      3 5 - 5 0 g/dL 4 0   TOTAL BILIRUBIN      0 20 - 1 00 mg/dL 0 59   eGFR      ml/min/1 73sq m 130           Sharif Crawford MD  [facility] PGY-1  11/30/2022 3:14 AM

## 2022-11-30 NOTE — PROCEDURES
Hubert Bernabe, barry  at 17w5d with an CHARO of 2023, by Last Menstrual Period, was seen at [address] for the following procedure(s): $Procedure Type: US - Transvaginal]                   Ultrasound Other  Fetal Presentation: Vertex  Cervical Length: 3 04  Funnel: No  Debris: No  Placenta Previa: No  Vasa Previa: No             Ultrasound Probe Disinfection    A transvaginal ultrasound was performed     Prior to use, disinfection was performed with High Level Disinfection Process (Trophon)  Probe serial number [id number] was used    Rudolph Wilkinson MD  22  5:26 AM

## 2024-02-21 PROBLEM — Z00.00 HEALTHCARE MAINTENANCE: Status: RESOLVED | Noted: 2018-12-18 | Resolved: 2024-02-21

## 2024-04-09 NOTE — PATIENT INSTRUCTIONS
--Take inhaled and oral steroids as directed  --Continue rescue inhaler as needed  --Continue OTC Clartin  Consider adding decongestant (Sudafed), OTC saline nasal spray and Flonase nasal spray  --Tylenol/Motrin as needed  --F/u with PCP if no improvement next 48-72 hours with these measures  --Go to ER if worsening cough, breathing, fever/chills, vomiting, other concerns 
steady unassisted gait

## 2025-01-22 RX ORDER — ALBUTEROL SULFATE 90 UG/1
2 INHALANT RESPIRATORY (INHALATION) EVERY 6 HOURS PRN
OUTPATIENT
Start: 2025-01-22